# Patient Record
Sex: FEMALE | Race: BLACK OR AFRICAN AMERICAN | NOT HISPANIC OR LATINO | Employment: FULL TIME | URBAN - METROPOLITAN AREA
[De-identification: names, ages, dates, MRNs, and addresses within clinical notes are randomized per-mention and may not be internally consistent; named-entity substitution may affect disease eponyms.]

---

## 2017-04-24 ENCOUNTER — PATIENT OUTREACH (OUTPATIENT)
Dept: ADMINISTRATIVE | Facility: HOSPITAL | Age: 37
End: 2017-04-24

## 2019-01-15 PROBLEM — E87.6 HYPOKALEMIA: Status: ACTIVE | Noted: 2019-01-15

## 2019-01-15 NOTE — PROGRESS NOTES
Subjective:       Patient ID: Akilah Parker is a 38 y.o. female who  has a past medical history of Acne vulgaris, Ganglion cyst, Hypertension, CHARISSE (iron deficiency anemia), Kidney stone, and Thyroid nodule (2014).    Chief Complaint: Establish Care (Low energy)    HPI    History was obtained from the patient and supplemented through chart review.  Saw OBGYN in the past for BV  Used to see Dr. Brenner in 2014 for hypertension.    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  Prior to that was receiving care at [facility] where she was working.    Low energy, insomnia:  Chronic.  Works 12 hr days, 7 on, 7 off with hospitalist group.  Goes to bed at 10:00 p.m. on work days.  On her off days, goes to bed at 8:00 p.m. and wakes up at 2:00 a.m..  The patient reports difficulty staying asleep.  The patient does watch TV at night and does have a TV in the bedroom; she keeps the TV on all night for white noise.  Will get something to drink and scroll through Facebook on her phone.  The bedroom is quiet and dark.  The patient does drink coke.  The patient does not wake up multiple times in the evening to urinate.  She reports racing thoughts at night and worries that she forgot to add orders.  Occasional ETOH. She does not exercise.    History of HTN:  Pt's BP is well controlled.  Used to take HCTZ 12.5; has been off x 1 year. Pt denies cp/sob.    Hypokalemia:  3.4 several years ago.  Was instructed to increase potassium through diet/bananas.    Thyroid nodule:  Thyroid ultrasound 2014 with stable scattered micro nodules.  No FNA needed.  TSH WNL in 2012.  Also stable annual ultrasounds at [facility].  Has BM q2-3 days.  Not straining or pebbly.    Acne vulgaris:  Used to be on Epiduo/benzoyl peroxide topical and Minocycyline    Obesity:  BMI 36.  Not exercising.  Has a gym at work.  Has to stay 12 hr and responds to stroke codes.    Review of Systems   Constitutional: Negative for fever and unexpected weight  change.   HENT: Negative for rhinorrhea and sneezing.    Eyes: Negative for redness and itching.   Respiratory: Negative for shortness of breath and wheezing.    Cardiovascular: Negative for chest pain and palpitations.   Gastrointestinal: Negative for abdominal pain and constipation.   Genitourinary: Negative for dysuria and hematuria.   Musculoskeletal: Negative for gait problem and joint swelling.   Skin: Negative for color change and rash.   Neurological: Negative for dizziness and light-headedness.   Hematological: Negative for adenopathy.   Psychiatric/Behavioral: Negative for self-injury and suicidal ideas.       Past Medical History:   Diagnosis Date    Acne vulgaris     Ganglion cyst     bilateral     Hypertension     CHARISSE (iron deficiency anemia)     Kidney stone     Thyroid nodule 2014    Scattered micro nodules, stable in size compared to 2014     Past Surgical History:   Procedure Laterality Date    CYSTOSCOPY Left 1/7/2014    [facility] at [facility]    EXTRACTION - STONE Left 1/7/2014    [facility] at [facility]    ORIF FOOT FRACTURE      right ankle    OTHER SURGICAL HISTORY      retrieved kidney stone without lithotripsy    PLACEMENT-STENT Left 1/7/2014    [facility]    TUBAL LIGATION      URETEROSCOPY Left 1/7/2014    [facility]     Family History   Problem Relation Age of Onset    Hyperlipidemia Father     Hypertension Father     Stroke Father     Aneurysm Maternal Grandmother     Heart disease Maternal Grandfather     Heart disease Paternal Grandmother     Hypertension Mother     Colon polyps Mother         40s, precancerous    Acne Neg Hx     Eczema Neg Hx     Melanoma Neg Hx     Breast cancer Neg Hx     Colon cancer Neg Hx     Ovarian cancer Neg Hx      Social History     Socioeconomic History    Marital status: Single     Spouse  "name: Not on file    Number of children: Not on file    Years of education: Not on file    Highest education level: Not on file   Social Needs    Financial resource strain: Not on file    Food insecurity - worry: Not on file    Food insecurity - inability: Not on file    Transportation needs - medical: Not on file    Transportation needs - non-medical: Not on file   Occupational History    Occupation: RN     Employer: [employer]   Tobacco Use    Smoking status: Never Smoker    Smokeless tobacco: Never Used   Substance and Sexual Activity    Alcohol use: Yes     Comment: occassionally    Drug use: No    Sexual activity: Yes     Partners: Male     Birth control/protection: None, Surgical   Other Topics Concern    Are you pregnant or think you may be? No    Breast-feeding No   Social History Narrative    Not on file     Objective:      Vitals:    01/16/19 0942   BP: (!) 121/90   Pulse: 87   SpO2: 100%   Weight: 100 kg (220 lb 7.4 oz)   Height: 5' 5" (1.651 m)      Physical Exam   Constitutional: She appears well-developed and well-nourished. No distress.   HENT:   Head: Normocephalic and atraumatic.   Nose: Nose normal.   Mouth/Throat: Oropharynx is clear and moist. No oropharyngeal exudate.   Eyes: Conjunctivae and EOM are normal. Pupils are equal, round, and reactive to light. Right eye exhibits no discharge. Left eye exhibits no discharge. No scleral icterus.   Neck: Neck supple. No tracheal deviation present. No thyromegaly present.   Cardiovascular: Normal rate, regular rhythm, normal heart sounds and intact distal pulses.   No murmur heard.  Pulmonary/Chest: Effort normal and breath sounds normal. No respiratory distress. She has no wheezes.   Abdominal: Soft. Bowel sounds are normal. There is no tenderness.   Musculoskeletal: She exhibits no tenderness or deformity.   Lymphadenopathy:     She has no cervical adenopathy.   Neurological: She is alert. No cranial nerve deficit. Gait " normal.   Skin: Skin is warm and dry. Capillary refill takes less than 2 seconds. She is not diaphoretic. No erythema.   Psychiatric: She has a normal mood and affect. Her behavior is normal.         Lab Results   Component Value Date    WBC 4.03 01/16/2019    HGB 11.0 (L) 01/16/2019    HCT 34.3 (L) 01/16/2019     01/16/2019    CHOL 184 01/16/2019    TRIG 44 01/16/2019    HDL 55 01/16/2019    ALT 6 (L) 01/16/2019    AST 13 01/16/2019     01/16/2019    K 4.0 01/16/2019     01/16/2019    CREATININE 0.9 01/16/2019    BUN 13 01/16/2019    CO2 25 01/16/2019    TSH 0.992 01/16/2019    HGBA1C 5.4 01/16/2019       The ASCVD Risk score (Vaibhavrishi EMERY Jr., et al., 2013) failed to calculate for the following reasons:    The 2013 ASCVD risk score is only valid for ages 40 to 79    (Imaging have been independently reviewed)  Thyroid ultrasound in 2014 showed prominent lymph node inferior to left thyroid node; stable compared to 2012    Assessment:       1. Other insomnia    2. Essential hypertension    3. Hypokalemia    4. Multiple thyroid nodules    5. Acne vulgaris    6. Obesity (BMI 30-39.9)    7. Healthcare maintenance    8. Encounter for lipid screening for cardiovascular disease    9. Encounter for screening for diabetes mellitus    10. Screening for thyroid disorder    11. Iron deficiency anemia, unspecified iron deficiency anemia type          Plan:       Akilah was seen today for establish care.    Diagnoses and all orders for this visit:    Other insomnia  Comments:  Discussed sleep hygiene, decreasing screen time, sravanthi for white noise rather than TV, keeping journal, exercise. Likely contributing to low energy. New CHARISSE.  Orders:  -     TSH; Future  -     T4, free; Future    Essential hypertension  Comments:  Used to be on HCTZ 12.5.  BP controlled off any BP meds.  Will continue to monitor    Hypokalemia  Comments:  Mildly low at 3.4 several years ago.  Rechecking  Orders:  -     Comprehensive metabolic  panel; Future    Multiple thyroid nodules  Comments:  Scattered micro nodules, not large enough for FNA.  Has been stable on annual ultrasounds here and at [facility]. Will discontinue annual U/S unless symtpoms.  Orders:  -     TSH; Future  -     T4, free; Future    Acne vulgaris  Comments:  Used to be on Epiduo/benzoyl peroxide and minocycline.  Well controlled off meds    Obesity (BMI 30-39.9)  Comments:  Is not exercising.  Will discuss diet and exercise in depth during next visit    Healthcare maintenance  -     CBC auto differential; Future  -     Comprehensive metabolic panel; Future    Encounter for lipid screening for cardiovascular disease  Comments:  Screening due to obesity  Orders:  -     Lipid panel; Future    Encounter for screening for diabetes mellitus  Comments:  Screening due to obesity  Orders:  -     Hemoglobin A1c; Future    Screening for thyroid disorder  Comments:  Screening due to low energy, history of multiple thyroid nodules  Orders:  -     TSH; Future  -     T4, free; Future    Iron deficiency anemia, unspecified iron deficiency anemia type  Comments:  Addendum: New CHARISSE. Ferritin 9. Calling the pt to start iron. Will discuss etiology of this during next visit. Might be contributing to low energy.  Orders:  -     ferrous sulfate 325 (65 FE) MG EC tablet; Take 1 tablet (325 mg total) by mouth 2 (two) times daily.  -     Ferritin; Future  -     Iron and TIBC; Future         Side effects of medication(s) were discussed in detail and patient voiced understanding.  Patient will call back for any issues or complications.     RTC in 1 month(s) or sooner PRN to discuss diet, exercise, monitor BP, well visit.

## 2019-01-16 ENCOUNTER — LAB VISIT (OUTPATIENT)
Dept: LAB | Facility: OTHER | Age: 39
End: 2019-01-16
Payer: COMMERCIAL

## 2019-01-16 ENCOUNTER — OFFICE VISIT (OUTPATIENT)
Dept: INTERNAL MEDICINE | Facility: CLINIC | Age: 39
End: 2019-01-16
Payer: COMMERCIAL

## 2019-01-16 VITALS
HEIGHT: 65 IN | OXYGEN SATURATION: 100 % | SYSTOLIC BLOOD PRESSURE: 121 MMHG | WEIGHT: 220.44 LBS | HEART RATE: 87 BPM | BODY MASS INDEX: 36.73 KG/M2 | DIASTOLIC BLOOD PRESSURE: 90 MMHG

## 2019-01-16 DIAGNOSIS — E66.9 OBESITY (BMI 30-39.9): ICD-10-CM

## 2019-01-16 DIAGNOSIS — Z13.6 ENCOUNTER FOR LIPID SCREENING FOR CARDIOVASCULAR DISEASE: ICD-10-CM

## 2019-01-16 DIAGNOSIS — E04.2 MULTIPLE THYROID NODULES: ICD-10-CM

## 2019-01-16 DIAGNOSIS — Z13.220 ENCOUNTER FOR LIPID SCREENING FOR CARDIOVASCULAR DISEASE: ICD-10-CM

## 2019-01-16 DIAGNOSIS — Z00.00 HEALTHCARE MAINTENANCE: ICD-10-CM

## 2019-01-16 DIAGNOSIS — E87.6 HYPOKALEMIA: ICD-10-CM

## 2019-01-16 DIAGNOSIS — Z13.1 ENCOUNTER FOR SCREENING FOR DIABETES MELLITUS: ICD-10-CM

## 2019-01-16 DIAGNOSIS — Z13.29 SCREENING FOR THYROID DISORDER: ICD-10-CM

## 2019-01-16 DIAGNOSIS — L70.0 ACNE VULGARIS: ICD-10-CM

## 2019-01-16 DIAGNOSIS — D50.9 IRON DEFICIENCY ANEMIA, UNSPECIFIED IRON DEFICIENCY ANEMIA TYPE: ICD-10-CM

## 2019-01-16 DIAGNOSIS — G47.09 OTHER INSOMNIA: Primary | ICD-10-CM

## 2019-01-16 DIAGNOSIS — I10 ESSENTIAL HYPERTENSION: Chronic | ICD-10-CM

## 2019-01-16 PROBLEM — D64.9 NORMOCYTIC ANEMIA: Status: ACTIVE | Noted: 2019-01-16

## 2019-01-16 LAB
ALBUMIN SERPL BCP-MCNC: 3.4 G/DL
ALP SERPL-CCNC: 70 U/L
ALT SERPL W/O P-5'-P-CCNC: 6 U/L
ANION GAP SERPL CALC-SCNC: 8 MMOL/L
AST SERPL-CCNC: 13 U/L
BASOPHILS # BLD AUTO: 0.01 K/UL
BASOPHILS NFR BLD: 0.2 %
BILIRUB SERPL-MCNC: 0.4 MG/DL
BUN SERPL-MCNC: 13 MG/DL
CALCIUM SERPL-MCNC: 9.2 MG/DL
CHLORIDE SERPL-SCNC: 107 MMOL/L
CHOLEST SERPL-MCNC: 184 MG/DL
CHOLEST/HDLC SERPL: 3.3 {RATIO}
CO2 SERPL-SCNC: 25 MMOL/L
CREAT SERPL-MCNC: 0.9 MG/DL
DIFFERENTIAL METHOD: ABNORMAL
EOSINOPHIL # BLD AUTO: 0.1 K/UL
EOSINOPHIL NFR BLD: 2.2 %
ERYTHROCYTE [DISTWIDTH] IN BLOOD BY AUTOMATED COUNT: 15 %
EST. GFR  (AFRICAN AMERICAN): >60 ML/MIN/1.73 M^2
EST. GFR  (NON AFRICAN AMERICAN): >60 ML/MIN/1.73 M^2
ESTIMATED AVG GLUCOSE: 108 MG/DL
FERRITIN SERPL-MCNC: 8 NG/ML
GLUCOSE SERPL-MCNC: 77 MG/DL
HBA1C MFR BLD HPLC: 5.4 %
HCT VFR BLD AUTO: 34.3 %
HDLC SERPL-MCNC: 55 MG/DL
HDLC SERPL: 29.9 %
HGB BLD-MCNC: 11 G/DL
IRON SERPL-MCNC: 41 UG/DL
LDLC SERPL CALC-MCNC: 120.2 MG/DL
LYMPHOCYTES # BLD AUTO: 1.7 K/UL
LYMPHOCYTES NFR BLD: 41.7 %
MCH RBC QN AUTO: 27.6 PG
MCHC RBC AUTO-ENTMCNC: 32.1 G/DL
MCV RBC AUTO: 86 FL
MONOCYTES # BLD AUTO: 0.3 K/UL
MONOCYTES NFR BLD: 6.9 %
NEUTROPHILS # BLD AUTO: 2 K/UL
NEUTROPHILS NFR BLD: 49 %
NONHDLC SERPL-MCNC: 129 MG/DL
PLATELET # BLD AUTO: 281 K/UL
PMV BLD AUTO: 11.1 FL
POTASSIUM SERPL-SCNC: 4 MMOL/L
PROT SERPL-MCNC: 7.3 G/DL
RBC # BLD AUTO: 3.98 M/UL
SATURATED IRON: 8 %
SODIUM SERPL-SCNC: 140 MMOL/L
T4 FREE SERPL-MCNC: 0.92 NG/DL
TOTAL IRON BINDING CAPACITY: 491 UG/DL
TRANSFERRIN SERPL-MCNC: 332 MG/DL
TRIGL SERPL-MCNC: 44 MG/DL
TSH SERPL DL<=0.005 MIU/L-ACNC: 0.99 UIU/ML
WBC # BLD AUTO: 4.03 K/UL

## 2019-01-16 PROCEDURE — 99999 PR PBB SHADOW E&M-EST. PATIENT-LVL III: CPT | Mod: PBBFAC,,, | Performed by: INTERNAL MEDICINE

## 2019-01-16 PROCEDURE — 83540 ASSAY OF IRON: CPT

## 2019-01-16 PROCEDURE — 36415 COLL VENOUS BLD VENIPUNCTURE: CPT

## 2019-01-16 PROCEDURE — 99385 PR PREVENTIVE VISIT,NEW,18-39: ICD-10-PCS | Mod: S$GLB,,, | Performed by: INTERNAL MEDICINE

## 2019-01-16 PROCEDURE — 3008F PR BODY MASS INDEX (BMI) DOCUMENTED: ICD-10-PCS | Mod: CPTII,S$GLB,, | Performed by: INTERNAL MEDICINE

## 2019-01-16 PROCEDURE — 84439 ASSAY OF FREE THYROXINE: CPT

## 2019-01-16 PROCEDURE — 3080F DIAST BP >= 90 MM HG: CPT | Mod: CPTII,S$GLB,, | Performed by: INTERNAL MEDICINE

## 2019-01-16 PROCEDURE — 3074F PR MOST RECENT SYSTOLIC BLOOD PRESSURE < 130 MM HG: ICD-10-PCS | Mod: CPTII,S$GLB,, | Performed by: INTERNAL MEDICINE

## 2019-01-16 PROCEDURE — 99999 PR PBB SHADOW E&M-EST. PATIENT-LVL III: ICD-10-PCS | Mod: PBBFAC,,, | Performed by: INTERNAL MEDICINE

## 2019-01-16 PROCEDURE — 85025 COMPLETE CBC W/AUTO DIFF WBC: CPT

## 2019-01-16 PROCEDURE — 80061 LIPID PANEL: CPT

## 2019-01-16 PROCEDURE — 82728 ASSAY OF FERRITIN: CPT

## 2019-01-16 PROCEDURE — 83036 HEMOGLOBIN GLYCOSYLATED A1C: CPT

## 2019-01-16 PROCEDURE — 3080F PR MOST RECENT DIASTOLIC BLOOD PRESSURE >= 90 MM HG: ICD-10-PCS | Mod: CPTII,S$GLB,, | Performed by: INTERNAL MEDICINE

## 2019-01-16 PROCEDURE — 84443 ASSAY THYROID STIM HORMONE: CPT

## 2019-01-16 PROCEDURE — 80053 COMPREHEN METABOLIC PANEL: CPT

## 2019-01-16 PROCEDURE — 3008F BODY MASS INDEX DOCD: CPT | Mod: CPTII,S$GLB,, | Performed by: INTERNAL MEDICINE

## 2019-01-16 PROCEDURE — 3074F SYST BP LT 130 MM HG: CPT | Mod: CPTII,S$GLB,, | Performed by: INTERNAL MEDICINE

## 2019-01-16 PROCEDURE — 99385 PREV VISIT NEW AGE 18-39: CPT | Mod: S$GLB,,, | Performed by: INTERNAL MEDICINE

## 2019-01-16 RX ORDER — FERROUS SULFATE 325(65) MG
325 TABLET, DELAYED RELEASE (ENTERIC COATED) ORAL 2 TIMES DAILY
Qty: 180 TABLET | Refills: 1 | Status: SHIPPED | OUTPATIENT
Start: 2019-01-16 | End: 2019-04-16

## 2019-01-17 ENCOUNTER — TELEPHONE (OUTPATIENT)
Dept: INTERNAL MEDICINE | Facility: CLINIC | Age: 39
End: 2019-01-17

## 2019-01-17 NOTE — TELEPHONE ENCOUNTER
Spoke with patient to discuss labs and patient stated that she already saw it on the patient portal thank you

## 2019-01-18 ENCOUNTER — TELEPHONE (OUTPATIENT)
Dept: INTERNAL MEDICINE | Facility: CLINIC | Age: 39
End: 2019-01-18

## 2019-01-18 DIAGNOSIS — K21.9 GASTROESOPHAGEAL REFLUX DISEASE, ESOPHAGITIS PRESENCE NOT SPECIFIED: ICD-10-CM

## 2019-01-18 RX ORDER — OMEPRAZOLE 40 MG/1
40 CAPSULE, DELAYED RELEASE ORAL EVERY MORNING
Qty: 90 CAPSULE | Refills: 0 | Status: SHIPPED | OUTPATIENT
Start: 2019-01-18 | End: 2020-05-12

## 2019-01-18 NOTE — TELEPHONE ENCOUNTER
----- Message from Aretha Fiore MA sent at 1/18/2019 11:36 AM CST -----  Contact: ENRIQUE STARR [[id number]]   Patient wants omeprazole 40mg daily ordered for acid reflux  ----- Message -----  From: Sammy Rader  Sent: 1/18/2019  11:16 AM  To: Vijay Stout Staff    Name of Who is Calling: ENRIQUE STARR [[id number]]       What is the request in detail: Patient called in regards to reflex medication. Pt request Omeprazole 40mg daily. Please advise.       Can the clinic reply by [facility]: No     What Number to [facility] Back [facility]: [phone]

## 2019-05-13 ENCOUNTER — PATIENT MESSAGE (OUTPATIENT)
Dept: INTERNAL MEDICINE | Facility: CLINIC | Age: 39
End: 2019-05-13

## 2019-05-13 DIAGNOSIS — G47.09 OTHER INSOMNIA: Primary | ICD-10-CM

## 2019-05-14 ENCOUNTER — PATIENT MESSAGE (OUTPATIENT)
Dept: INTERNAL MEDICINE | Facility: CLINIC | Age: 39
End: 2019-05-14

## 2019-05-14 RX ORDER — ESZOPICLONE 1 MG/1
1 TABLET, FILM COATED ORAL NIGHTLY PRN
Qty: 30 TABLET | Refills: 1 | Status: SHIPPED | OUTPATIENT
Start: 2019-05-14 | End: 2019-06-13

## 2020-04-21 DIAGNOSIS — Z01.84 ANTIBODY RESPONSE EXAMINATION: ICD-10-CM

## 2020-04-28 ENCOUNTER — LAB VISIT (OUTPATIENT)
Dept: LAB | Facility: HOSPITAL | Age: 40
End: 2020-04-28
Attending: INTERNAL MEDICINE
Payer: COMMERCIAL

## 2020-04-28 DIAGNOSIS — Z01.84 ANTIBODY RESPONSE EXAMINATION: ICD-10-CM

## 2020-04-28 LAB — SARS-COV-2 IGG SERPL QL IA: NEGATIVE

## 2020-04-28 PROCEDURE — 86769 SARS-COV-2 COVID-19 ANTIBODY: CPT

## 2020-04-28 PROCEDURE — 36415 COLL VENOUS BLD VENIPUNCTURE: CPT

## 2020-05-11 ENCOUNTER — TELEPHONE (OUTPATIENT)
Dept: ADMINISTRATIVE | Facility: HOSPITAL | Age: 40
End: 2020-05-11

## 2020-05-11 NOTE — TELEPHONE ENCOUNTER
The patient was phoned about an upcoming visit. The patient stated that she will be getting off from work on tomorrow morning and would like her lab work ordered so that she can do them on tomorrow morning. I will send a message to her PCP.      Consuelo MUHAMMAD  Clinical Care Coordinator  Internal Medicine  [facility]  [phone]

## 2020-05-12 ENCOUNTER — OFFICE VISIT (OUTPATIENT)
Dept: INTERNAL MEDICINE | Facility: CLINIC | Age: 40
End: 2020-05-12
Payer: COMMERCIAL

## 2020-05-12 ENCOUNTER — TELEPHONE (OUTPATIENT)
Dept: OBSTETRICS AND GYNECOLOGY | Facility: CLINIC | Age: 40
End: 2020-05-12

## 2020-05-12 ENCOUNTER — LAB VISIT (OUTPATIENT)
Dept: LAB | Facility: OTHER | Age: 40
End: 2020-05-12
Attending: INTERNAL MEDICINE
Payer: COMMERCIAL

## 2020-05-12 VITALS
DIASTOLIC BLOOD PRESSURE: 78 MMHG | HEIGHT: 65 IN | BODY MASS INDEX: 34.85 KG/M2 | SYSTOLIC BLOOD PRESSURE: 122 MMHG | WEIGHT: 209.19 LBS | HEART RATE: 72 BPM | OXYGEN SATURATION: 99 %

## 2020-05-12 DIAGNOSIS — Z11.4 SCREENING FOR HIV (HUMAN IMMUNODEFICIENCY VIRUS): ICD-10-CM

## 2020-05-12 DIAGNOSIS — E66.9 OBESITY (BMI 30.0-34.9): ICD-10-CM

## 2020-05-12 DIAGNOSIS — D50.9 IRON DEFICIENCY ANEMIA, UNSPECIFIED IRON DEFICIENCY ANEMIA TYPE: ICD-10-CM

## 2020-05-12 DIAGNOSIS — Z00.00 ANNUAL PHYSICAL EXAM: Primary | ICD-10-CM

## 2020-05-12 DIAGNOSIS — I10 ESSENTIAL HYPERTENSION: ICD-10-CM

## 2020-05-12 DIAGNOSIS — Z13.220 ENCOUNTER FOR LIPID SCREENING FOR CARDIOVASCULAR DISEASE: ICD-10-CM

## 2020-05-12 DIAGNOSIS — Z13.6 ENCOUNTER FOR LIPID SCREENING FOR CARDIOVASCULAR DISEASE: ICD-10-CM

## 2020-05-12 DIAGNOSIS — E04.2 MULTIPLE THYROID NODULES: ICD-10-CM

## 2020-05-12 DIAGNOSIS — Z23 NEED FOR TETANUS, DIPHTHERIA, AND ACELLULAR PERTUSSIS (TDAP) VACCINE: ICD-10-CM

## 2020-05-12 DIAGNOSIS — N92.0 MENORRHAGIA WITH REGULAR CYCLE: ICD-10-CM

## 2020-05-12 PROBLEM — E66.811 OBESITY (BMI 30.0-34.9): Status: ACTIVE | Noted: 2019-01-16

## 2020-05-12 LAB
ALBUMIN SERPL BCP-MCNC: 3.6 G/DL (ref 3.5–5.2)
ALP SERPL-CCNC: 65 U/L (ref 55–135)
ALT SERPL W/O P-5'-P-CCNC: 8 U/L (ref 10–44)
ANION GAP SERPL CALC-SCNC: 9 MMOL/L (ref 8–16)
AST SERPL-CCNC: 14 U/L (ref 10–40)
BASOPHILS # BLD AUTO: 0.02 K/UL (ref 0–0.2)
BASOPHILS NFR BLD: 0.4 % (ref 0–1.9)
BILIRUB SERPL-MCNC: 0.4 MG/DL (ref 0.1–1)
BUN SERPL-MCNC: 10 MG/DL (ref 6–20)
CALCIUM SERPL-MCNC: 9 MG/DL (ref 8.7–10.5)
CHLORIDE SERPL-SCNC: 107 MMOL/L (ref 95–110)
CHOLEST SERPL-MCNC: 173 MG/DL (ref 120–199)
CHOLEST/HDLC SERPL: 3.5 {RATIO} (ref 2–5)
CO2 SERPL-SCNC: 25 MMOL/L (ref 23–29)
CREAT SERPL-MCNC: 0.9 MG/DL (ref 0.5–1.4)
DIFFERENTIAL METHOD: ABNORMAL
EOSINOPHIL # BLD AUTO: 0.1 K/UL (ref 0–0.5)
EOSINOPHIL NFR BLD: 1.8 % (ref 0–8)
ERYTHROCYTE [DISTWIDTH] IN BLOOD BY AUTOMATED COUNT: 18.5 % (ref 11.5–14.5)
EST. GFR  (AFRICAN AMERICAN): >60 ML/MIN/1.73 M^2
EST. GFR  (NON AFRICAN AMERICAN): >60 ML/MIN/1.73 M^2
GLUCOSE SERPL-MCNC: 79 MG/DL (ref 70–110)
HCT VFR BLD AUTO: 28.5 % (ref 37–48.5)
HDLC SERPL-MCNC: 49 MG/DL (ref 40–75)
HDLC SERPL: 28.3 % (ref 20–50)
HGB BLD-MCNC: 8.5 G/DL (ref 12–16)
IMM GRANULOCYTES # BLD AUTO: 0.01 K/UL (ref 0–0.04)
IMM GRANULOCYTES NFR BLD AUTO: 0.2 % (ref 0–0.5)
IRON SERPL-MCNC: 20 UG/DL (ref 30–160)
LDLC SERPL CALC-MCNC: 114.2 MG/DL (ref 63–159)
LYMPHOCYTES # BLD AUTO: 1.9 K/UL (ref 1–4.8)
LYMPHOCYTES NFR BLD: 37.6 % (ref 18–48)
MCH RBC QN AUTO: 23 PG (ref 27–31)
MCHC RBC AUTO-ENTMCNC: 29.8 G/DL (ref 32–36)
MCV RBC AUTO: 77 FL (ref 82–98)
MONOCYTES # BLD AUTO: 0.4 K/UL (ref 0.3–1)
MONOCYTES NFR BLD: 7 % (ref 4–15)
NEUTROPHILS # BLD AUTO: 2.6 K/UL (ref 1.8–7.7)
NEUTROPHILS NFR BLD: 53 % (ref 38–73)
NONHDLC SERPL-MCNC: 124 MG/DL
NRBC BLD-RTO: 0 /100 WBC
PLATELET # BLD AUTO: 418 K/UL (ref 150–350)
PMV BLD AUTO: 11 FL (ref 9.2–12.9)
POTASSIUM SERPL-SCNC: 3.7 MMOL/L (ref 3.5–5.1)
PROT SERPL-MCNC: 7.3 G/DL (ref 6–8.4)
RBC # BLD AUTO: 3.7 M/UL (ref 4–5.4)
SATURATED IRON: 4 % (ref 20–50)
SODIUM SERPL-SCNC: 141 MMOL/L (ref 136–145)
TOTAL IRON BINDING CAPACITY: 540 UG/DL (ref 250–450)
TRANSFERRIN SERPL-MCNC: 365 MG/DL (ref 200–375)
TRIGL SERPL-MCNC: 49 MG/DL (ref 30–150)
TSH SERPL DL<=0.005 MIU/L-ACNC: 1.52 UIU/ML (ref 0.4–4)
WBC # BLD AUTO: 4.98 K/UL (ref 3.9–12.7)

## 2020-05-12 PROCEDURE — 36415 COLL VENOUS BLD VENIPUNCTURE: CPT

## 2020-05-12 PROCEDURE — 99999 PR PBB SHADOW E&M-EST. PATIENT-LVL III: CPT | Mod: PBBFAC,,, | Performed by: INTERNAL MEDICINE

## 2020-05-12 PROCEDURE — 3074F SYST BP LT 130 MM HG: CPT | Mod: CPTII,S$GLB,, | Performed by: INTERNAL MEDICINE

## 2020-05-12 PROCEDURE — 80053 COMPREHEN METABOLIC PANEL: CPT

## 2020-05-12 PROCEDURE — 85025 COMPLETE CBC W/AUTO DIFF WBC: CPT

## 2020-05-12 PROCEDURE — 3078F PR MOST RECENT DIASTOLIC BLOOD PRESSURE < 80 MM HG: ICD-10-PCS | Mod: CPTII,S$GLB,, | Performed by: INTERNAL MEDICINE

## 2020-05-12 PROCEDURE — 83540 ASSAY OF IRON: CPT

## 2020-05-12 PROCEDURE — 99395 PREV VISIT EST AGE 18-39: CPT | Mod: S$GLB,,, | Performed by: INTERNAL MEDICINE

## 2020-05-12 PROCEDURE — 86703 HIV-1/HIV-2 1 RESULT ANTBDY: CPT

## 2020-05-12 PROCEDURE — 3078F DIAST BP <80 MM HG: CPT | Mod: CPTII,S$GLB,, | Performed by: INTERNAL MEDICINE

## 2020-05-12 PROCEDURE — 82607 VITAMIN B-12: CPT

## 2020-05-12 PROCEDURE — 82728 ASSAY OF FERRITIN: CPT

## 2020-05-12 PROCEDURE — 99395 PR PREVENTIVE VISIT,EST,18-39: ICD-10-PCS | Mod: S$GLB,,, | Performed by: INTERNAL MEDICINE

## 2020-05-12 PROCEDURE — 3074F PR MOST RECENT SYSTOLIC BLOOD PRESSURE < 130 MM HG: ICD-10-PCS | Mod: CPTII,S$GLB,, | Performed by: INTERNAL MEDICINE

## 2020-05-12 PROCEDURE — 84443 ASSAY THYROID STIM HORMONE: CPT

## 2020-05-12 PROCEDURE — 99999 PR PBB SHADOW E&M-EST. PATIENT-LVL III: ICD-10-PCS | Mod: PBBFAC,,, | Performed by: INTERNAL MEDICINE

## 2020-05-12 PROCEDURE — 80061 LIPID PANEL: CPT

## 2020-05-12 RX ORDER — FERROUS SULFATE 325(65) MG
325 TABLET, DELAYED RELEASE (ENTERIC COATED) ORAL 2 TIMES DAILY
Qty: 180 TABLET | Refills: 3 | Status: SHIPPED | OUTPATIENT
Start: 2020-05-12 | End: 2021-05-12

## 2020-05-12 NOTE — TELEPHONE ENCOUNTER
----- Message from Leny Barraza sent at 5/12/2020  1:51 PM CDT -----  Contact: Patient [phone]   Name of Caller Patient  Reason for Visit/Symptoms Heavy Menstrual   Best Contact Number or Confirm if Mychart Preferred [phone]  Preferred Date/Time of Appointment Soon  Interested in Virtual Visit (yes/no) No  Additional Information Pt would like to come into clinic for visit.           Patient has new pt appt on 6/5/2020 for annual and fibroids.

## 2020-05-12 NOTE — PROGRESS NOTES
Subjective:       Patient ID: Akilah Parker is a 39 y.o. female who  has a past medical history of Acne vulgaris, Ganglion cyst, Hypertension, CHARISSE (iron deficiency anemia), Kidney stone, and Thyroid nodule (2014).    Chief Complaint: Annual Exam and Anemia    History was obtained from the patient and supplemented through chart review.  There were no ER or clinic visits since our last appointment.    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      HPI    CHARISSE:   Has not been taking Iron supplements.  Feels that her anemia is worse.  Now craving ice, strong fumes. Has hair thinning.  No fatigue, SOB, palpitation.    + menorrhagia, worsening.  H/o fibroids, but previously declined hyst.  Now desires hyst. Reports regular cycles.  Periods last 7-8 days with clots.  Planning to see OBGYN on the [employer].   Lab Results   Component Value Date    IRON 41 01/16/2019    TIBC 491 (H) 01/16/2019    FERRITIN 8 (L) 01/16/2019     No results found for: [id number]  No results found for: FOLATE    History of HTN:    Pt's BP is well controlled.  Used to take HCTZ 12.5; has been off since 2018. Pt denies cp/sob.    Thyroid nodule:    Thyroid ultrasound 2014 with stable scattered micro nodules.  No FNA needed.  Also had stable annual ultrasounds at [facility].  Has not noticed goiter or nodules.  Lab Results   Component Value Date    TSH 0.992 01/16/2019    FREET4 0.92 01/16/2019     Obesity:  BMI 34.  Has a gym at work at night.  Recently going to park.            Not addressed today.  Low energy, insomnia:    Chronic.  Works 12 hr days, 7 on, 7 off with [employer].  Goes to bed at 10:00 p.m. on work days.  On her off days, goes to bed at 8:00 p.m. and wakes up at 2:00 a.m..  The patient reports difficulty staying asleep.  The patient does watch TV at night and does have a TV in the bedroom; she keeps the TV on all night for white noise.  Will get something to drink and scroll through Facebook on her  "phone.  The bedroom is quiet and dark.  The patient does drink coke.  The patient does not wake up multiple times in the evening to urinate.  She reports racing thoughts at night and worries that she forgot to add orders.  Occasional ETOH. She does not exercise.    Restless at night    Review of Systems   Constitutional: Negative for activity change, fatigue, fever and unexpected weight change.   HENT: Negative for hearing loss, rhinorrhea, sneezing and trouble swallowing.    Eyes: Negative for discharge, redness, itching and visual disturbance.   Respiratory: Negative for chest tightness, shortness of breath and wheezing.    Cardiovascular: Negative for chest pain and palpitations.   Gastrointestinal: Negative for abdominal pain, blood in stool, constipation, diarrhea and vomiting.   Endocrine: Negative for polydipsia and polyuria.   Genitourinary: Positive for menstrual problem. Negative for difficulty urinating, dysuria and hematuria.   Musculoskeletal: Negative for arthralgias, gait problem, joint swelling and neck pain.   Skin: Negative for color change and rash.   Neurological: Negative for dizziness, weakness, light-headedness and headaches.   Hematological: Negative for adenopathy.   Psychiatric/Behavioral: Negative for confusion and dysphoric mood.       I personally reviewed Past Medical History, Past Surgical History, Social History, and Family History.    Objective:      Vitals:    05/12/20 1320   BP: 122/78   Pulse: 72   SpO2: 99%   Weight: 94.9 kg (209 lb 3.5 oz)   Height: 5' 5" (1.651 m)      Physical Exam   Constitutional: She appears well-developed and well-nourished. No distress.   HENT:   Head: Normocephalic and atraumatic.   Nose: Nose normal.   Mouth/Throat: Oropharynx is clear and moist. No oropharyngeal exudate.   Eyes: Conjunctivae and EOM are normal. Right eye exhibits no discharge. Left eye exhibits no discharge. No scleral icterus.   Pale conjunctiva   Neck: Neck supple. No tracheal " deviation present. No thyromegaly present.   Cardiovascular: Normal rate, regular rhythm, normal heart sounds and intact distal pulses.   No murmur heard.  Pulmonary/Chest: Effort normal and breath sounds normal. No respiratory distress. She has no wheezes.   Abdominal: Soft. Bowel sounds are normal. There is no tenderness.   Musculoskeletal: She exhibits no tenderness or deformity.   Lymphadenopathy:     She has no cervical adenopathy.   Neurological: She is alert. No cranial nerve deficit. Gait normal.   Skin: Skin is warm and dry. Capillary refill takes less than 2 seconds. She is not diaphoretic. No erythema.   Psychiatric: She has a normal mood and affect. Her behavior is normal.         Lab Results   Component Value Date    WBC 4.03 01/16/2019    HGB 11.0 (L) 01/16/2019    HCT 34.3 (L) 01/16/2019     01/16/2019    CHOL 184 01/16/2019    TRIG 44 01/16/2019    HDL 55 01/16/2019    ALT 6 (L) 01/16/2019    AST 13 01/16/2019     01/16/2019    K 4.0 01/16/2019     01/16/2019    CREATININE 0.9 01/16/2019    BUN 13 01/16/2019    CO2 25 01/16/2019    TSH 0.992 01/16/2019    HGBA1C 5.4 01/16/2019       The ASCVD Risk score (Franklin DC Jr., et al., 2013) failed to calculate for the following reasons:    The 2013 ASCVD risk score is only valid for ages 40 to 79    (Imaging have been independently reviewed)  Thyroid ultrasound in 2014 showed prominent lymph node inferior to left thyroid node; stable compared to 2012    Assessment:       1. Annual physical exam    2. Iron deficiency anemia, unspecified iron deficiency anemia type    3. Menorrhagia with regular cycle    4. Essential hypertension    5. Multiple thyroid nodules    6. Obesity (BMI 30.0-34.9)    7. Screening for HIV (human immunodeficiency virus)    8. Encounter for lipid screening for cardiovascular disease    9. Need for tetanus, diphtheria, and acellular pertussis (Tdap) vaccine          Plan:       Akilah was seen today for annual exam and  anemia.    Diagnoses and all orders for this visit:    Annual physical exam    Iron deficiency anemia, unspecified iron deficiency anemia type  Comments:  2/2 menorrhagia. Repeat iron panel. Start iron BID. Will make appt with OBGYN on the [employer] to discuss hyst.  Orders:  -     Ferritin; Future  -     Iron and TIBC; Future  -     Vitamin B12; Future  -     CBC auto differential; Future  -     ferrous sulfate 325 (65 FE) MG EC tablet; Take 1 tablet (325 mg total) by mouth 2 (two) times daily.    Menorrhagia with regular cycle  Comments:  Worsened.  As above    Essential hypertension  Comments:  Well controlled without medications.  Monitor.  Orders:  -     Comprehensive metabolic panel; Future    Multiple thyroid nodules  Comments:  Has had stable thyroid ultrasounds.  Will check annual TSH.  Orders:  -     TSH; Future    Obesity (BMI 30.0-34.9)  Comments:  Discussed exercise regularly.    Screening for HIV (human immunodeficiency virus)  -     HIV 1/2 Ag/Ab (4th Gen); Future    Encounter for lipid screening for cardiovascular disease  -     Lipid Panel; Future    Need for tetanus, diphtheria, and acellular pertussis (Tdap) vaccine  Comments:  Unsure last Tdap.  Advised to obtain vaccine at [facility]    Other orders  -     Cancel: Tissue Transglutaminase IgG; Future  -     Cancel: Ambulatory referral/consult to Obstetrics / Gynecology; Future  -     Cancel: Occult blood x 1, stool; Future  -     Cancel: Occult blood x 1, stool; Future  -     Cancel: Occult blood x 1, stool; Future         Side effects of medication(s) were discussed in detail and patient voiced understanding.  Patient will call back for any issues or complications.     RTC in 1 year or sooner PRN for annual.

## 2020-05-13 DIAGNOSIS — E53.8 CYANOCOBALAMIN DEFICIENCY: ICD-10-CM

## 2020-05-13 LAB
FERRITIN SERPL-MCNC: 3 NG/ML (ref 20–300)
HIV 1+2 AB+HIV1 P24 AG SERPL QL IA: NEGATIVE
VIT B12 SERPL-MCNC: 253 PG/ML (ref 210–950)

## 2020-05-13 RX ORDER — PNV NO.95/FERROUS FUM/FOLIC AC 28MG-0.8MG
100 TABLET ORAL DAILY
Qty: 90 TABLET | Refills: 3 | Status: SHIPPED | OUTPATIENT
Start: 2020-05-13 | End: 2021-05-13

## 2020-05-25 ENCOUNTER — PATIENT OUTREACH (OUTPATIENT)
Dept: ADMINISTRATIVE | Facility: OTHER | Age: 40
End: 2020-05-25

## 2020-05-27 ENCOUNTER — OFFICE VISIT (OUTPATIENT)
Dept: OBSTETRICS AND GYNECOLOGY | Facility: CLINIC | Age: 40
End: 2020-05-27
Payer: COMMERCIAL

## 2020-05-27 ENCOUNTER — HOSPITAL ENCOUNTER (OUTPATIENT)
Dept: RADIOLOGY | Facility: HOSPITAL | Age: 40
Discharge: HOME | End: 2020-05-27
Attending: OBSTETRICS & GYNECOLOGY
Payer: COMMERCIAL

## 2020-05-27 VITALS
DIASTOLIC BLOOD PRESSURE: 88 MMHG | SYSTOLIC BLOOD PRESSURE: 146 MMHG | HEIGHT: 65 IN | WEIGHT: 205 LBS | BODY MASS INDEX: 34.16 KG/M2

## 2020-05-27 DIAGNOSIS — D25.1 FIBROIDS, INTRAMURAL: ICD-10-CM

## 2020-05-27 DIAGNOSIS — Z01.419 GYNECOLOGIC EXAM NORMAL: Primary | ICD-10-CM

## 2020-05-27 DIAGNOSIS — N92.0 MENORRHAGIA WITH REGULAR CYCLE: ICD-10-CM

## 2020-05-27 PROCEDURE — 3077F PR MOST RECENT SYSTOLIC BLOOD PRESSURE >= 140 MM HG: ICD-10-PCS | Mod: CPTII,S$GLB,, | Performed by: OBSTETRICS & GYNECOLOGY

## 2020-05-27 PROCEDURE — 88175 CYTOPATH C/V AUTO FLUID REDO: CPT

## 2020-05-27 PROCEDURE — 76830 TRANSVAGINAL US NON-OB: CPT | Mod: TC

## 2020-05-27 PROCEDURE — 3079F PR MOST RECENT DIASTOLIC BLOOD PRESSURE 80-89 MM HG: ICD-10-PCS | Mod: CPTII,S$GLB,, | Performed by: OBSTETRICS & GYNECOLOGY

## 2020-05-27 PROCEDURE — 76856 US PELVIS COMP WITH TRANSVAG NON-OB (XPD): ICD-10-PCS | Mod: 26,,, | Performed by: RADIOLOGY

## 2020-05-27 PROCEDURE — 99385 PREV VISIT NEW AGE 18-39: CPT | Mod: S$GLB,,, | Performed by: OBSTETRICS & GYNECOLOGY

## 2020-05-27 PROCEDURE — 76856 US EXAM PELVIC COMPLETE: CPT | Mod: 26,,, | Performed by: RADIOLOGY

## 2020-05-27 PROCEDURE — 76830 TRANSVAGINAL US NON-OB: CPT | Mod: 26,,, | Performed by: RADIOLOGY

## 2020-05-27 PROCEDURE — 99999 PR PBB SHADOW E&M-EST. PATIENT-LVL III: CPT | Mod: PBBFAC,,, | Performed by: OBSTETRICS & GYNECOLOGY

## 2020-05-27 PROCEDURE — 99999 PR PBB SHADOW E&M-EST. PATIENT-LVL III: ICD-10-PCS | Mod: PBBFAC,,, | Performed by: OBSTETRICS & GYNECOLOGY

## 2020-05-27 PROCEDURE — 99385 PR PREVENTIVE VISIT,NEW,18-39: ICD-10-PCS | Mod: S$GLB,,, | Performed by: OBSTETRICS & GYNECOLOGY

## 2020-05-27 PROCEDURE — 87624 HPV HI-RISK TYP POOLED RSLT: CPT

## 2020-05-27 PROCEDURE — 3079F DIAST BP 80-89 MM HG: CPT | Mod: CPTII,S$GLB,, | Performed by: OBSTETRICS & GYNECOLOGY

## 2020-05-27 PROCEDURE — 76830 US PELVIS COMP WITH TRANSVAG NON-OB (XPD): ICD-10-PCS | Mod: 26,,, | Performed by: RADIOLOGY

## 2020-05-27 PROCEDURE — 3077F SYST BP >= 140 MM HG: CPT | Mod: CPTII,S$GLB,, | Performed by: OBSTETRICS & GYNECOLOGY

## 2020-05-27 NOTE — PROGRESS NOTES
Subjective:       Patient ID: Akilah Parker is a 39 y.o. female.    Chief Complaint:  Gynecologic Exam      History of Present Illness  HPI  Annual Exam-Premenopausal  Patient presents for annual exam. The patient is sexually active. GYN screening history: last pap: approximate date 2013 and was normal. The patient wears seatbelts: yes. The patient participates in regular exercise: not asked. Has the patient ever been transfused or tattooed?: yes. The patient reports that there is not domestic violence in her life.    Pt c/o prolonged, heavy menses lasting 7-8 days.  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  Pt is known to have fibroids.  Pt has tubal so pt does not desire future fertility.  2 prior 's.  Pt is enquiring about hysterectomy.                  GYN & OB History  Patient's last menstrual period was 2020.   Date of Last Pap: 2013    OB History    Para Term  AB Living   2 2       2   SAB TAB Ectopic Multiple Live Births           2      # Outcome Date GA Lbr Michael/2nd Weight Sex Delivery Anes PTL Lv   2 Para      Vag-Spont   ASTER   1 Para      Vag-Spont   ASTER       Review of Systems  Review of Systems   Constitutional: Negative.    Eyes: Negative.    Respiratory: Negative.    Cardiovascular: Negative.    Gastrointestinal: Negative.    Endocrine: Negative.    Genitourinary: Positive for dysmenorrhea, menorrhagia and menstrual problem. Negative for pelvic pain, urgency, vaginal bleeding, vaginal discharge, vaginal pain, postcoital bleeding, postmenopausal bleeding, vaginal dryness and vaginal odor.   Musculoskeletal: Negative.    Neurological: Negative.    Hematological: Negative.    Psychiatric/Behavioral: Negative.    Breast: negative.            Objective:    Physical Exam:   Constitutional: She is oriented to person, place, and time. She appears well-developed and well-nourished. No distress.    HENT:   Head: Normocephalic  and atraumatic.     Neck: Normal range of motion.    Cardiovascular: Normal rate.     Pulmonary/Chest: Effort normal. No respiratory distress. Right breast exhibits no inverted nipple, no mass, no nipple discharge, no skin change, no tenderness, presence, no bleeding and no swelling. Left breast exhibits no inverted nipple, no mass, no nipple discharge, no skin change, no tenderness, presence, no bleeding and no swelling.        Abdominal: Soft. She exhibits no distension and no mass. There is no tenderness. There is no rebound and no guarding.     Genitourinary: Vagina normal. Pelvic exam was performed with patient supine. There is no rash, tenderness, lesion or injury on the right labia. There is no rash, tenderness, lesion or injury on the left labia. Uterus is enlarged and hosting fibroids. Uterus is not deviated, not fixed, present and not experiencing uterine prolapse. Cervix is normal. Right adnexum displays no mass, no tenderness and no fullness. Left adnexum displays no mass, no tenderness and no fullness. Labial bartholins normal.          Musculoskeletal: Normal range of motion and moves all extremeties. She exhibits no tenderness.       Neurological: She is alert and oriented to person, place, and time. No cranial nerve deficit. Coordination normal.    Skin: She is not diaphoretic.    Psychiatric: She has a normal mood and affect. Her behavior is normal. Judgment and thought content normal.          Assessment:        1. Gynecologic exam normal    2. Menorrhagia with regular cycle    3. Fibroids, intramural               Plan:      1.  Pelvic sono ordered  2.  Pap w/ HR HPV collected  3.  Pt to call when she desires surgery, but she desires within next month or 2

## 2020-05-28 ENCOUNTER — TELEPHONE (OUTPATIENT)
Dept: OBSTETRICS AND GYNECOLOGY | Facility: HOSPITAL | Age: 40
End: 2020-05-28

## 2020-05-28 ENCOUNTER — TELEPHONE (OUTPATIENT)
Dept: OBSTETRICS AND GYNECOLOGY | Facility: CLINIC | Age: 40
End: 2020-05-28

## 2020-05-28 DIAGNOSIS — D50.9 IRON DEFICIENCY ANEMIA, UNSPECIFIED IRON DEFICIENCY ANEMIA TYPE: ICD-10-CM

## 2020-05-28 DIAGNOSIS — N92.0 MENORRHAGIA WITH REGULAR CYCLE: ICD-10-CM

## 2020-05-28 DIAGNOSIS — D25.1 FIBROIDS, INTRAMURAL: Primary | ICD-10-CM

## 2020-05-28 NOTE — TELEPHONE ENCOUNTER
----- Message from Makeda Marrero sent at 5/28/2020 12:41 PM CDT -----  Contact: ENRIQUE STARR [[id number]]  Name of Who is Calling: ENRIQUE STARR [[id number]]    What is the request in detail: Would like to speak with staff to schedule surgery. Please contact to further discuss and advise      Can the clinic reply by [facility]: yes    What [facility] Back [facility]: [phone]          Patient is aware that I will work on her surg date requested 8/4/2020 along with Dr Oliva

## 2020-05-29 NOTE — TELEPHONE ENCOUNTER
----- Message from Denice Wetzel MA sent at 5/28/2020  2:20 PM CDT -----  Contact: ENRIQUE STARR [[id number]]  Pt is requesting Aug 4 for hysterectomy. Have you looked at the US report to see if she is good to go and if so is it TLH  ----- Message -----  From: Makeda Marrero  Sent: 5/28/2020  12:41 PM CDT  To: Hazel FORD Staff    Name of Who is Calling: ENRIQUE STARR [[id number]]    What is the request in detail: Would like to speak with staff to schedule surgery. Please contact to further discuss and advise      Can the clinic reply by [facility]: yes    What Number to [facility] Back [facility]: [phone]

## 2020-06-01 LAB
FINAL PATHOLOGIC DIAGNOSIS: NORMAL
Lab: NORMAL

## 2020-06-02 LAB
HPV HR 12 DNA SPEC QL NAA+PROBE: NEGATIVE
HPV16 AG SPEC QL: NEGATIVE
HPV18 DNA SPEC QL NAA+PROBE: NEGATIVE

## 2020-06-10 ENCOUNTER — PATIENT MESSAGE (OUTPATIENT)
Dept: INTERNAL MEDICINE | Facility: CLINIC | Age: 40
End: 2020-06-10

## 2020-06-10 DIAGNOSIS — I10 ESSENTIAL HYPERTENSION: Primary | ICD-10-CM

## 2020-06-10 DIAGNOSIS — R60.0 BILATERAL LEG EDEMA: ICD-10-CM

## 2020-06-10 RX ORDER — HYDROCHLOROTHIAZIDE 12.5 MG/1
12.5 TABLET ORAL DAILY
Qty: 30 TABLET | Refills: 11 | Status: SHIPPED | OUTPATIENT
Start: 2020-06-10 | End: 2021-07-23 | Stop reason: SDUPTHER

## 2020-07-24 ENCOUNTER — PATIENT OUTREACH (OUTPATIENT)
Dept: ADMINISTRATIVE | Facility: OTHER | Age: 40
End: 2020-07-24

## 2020-07-24 NOTE — PROGRESS NOTES
Requested updates within Care Everywhere.  Patient's chart was reviewed for overdue MIKE topics.  Immunizations reconciled.

## 2020-07-27 ENCOUNTER — OFFICE VISIT (OUTPATIENT)
Dept: OBSTETRICS AND GYNECOLOGY | Facility: CLINIC | Age: 40
End: 2020-07-27
Payer: COMMERCIAL

## 2020-07-27 ENCOUNTER — HOSPITAL ENCOUNTER (OUTPATIENT)
Dept: PREADMISSION TESTING | Facility: HOSPITAL | Age: 40
Discharge: HOME | End: 2020-07-27
Attending: OBSTETRICS & GYNECOLOGY
Payer: COMMERCIAL

## 2020-07-27 ENCOUNTER — ANESTHESIA EVENT (OUTPATIENT)
Dept: SURGERY | Facility: HOSPITAL | Age: 40
End: 2020-07-27
Payer: COMMERCIAL

## 2020-07-27 VITALS
DIASTOLIC BLOOD PRESSURE: 90 MMHG | WEIGHT: 203 LBS | SYSTOLIC BLOOD PRESSURE: 133 MMHG | HEIGHT: 65 IN | BODY MASS INDEX: 33.82 KG/M2

## 2020-07-27 DIAGNOSIS — Z01.818 PRE-OP EXAMINATION: ICD-10-CM

## 2020-07-27 DIAGNOSIS — D25.1 FIBROIDS, INTRAMURAL: ICD-10-CM

## 2020-07-27 DIAGNOSIS — N92.0 MENORRHAGIA WITH REGULAR CYCLE: ICD-10-CM

## 2020-07-27 DIAGNOSIS — D25.1 FIBROIDS, INTRAMURAL: Primary | ICD-10-CM

## 2020-07-27 LAB
ALBUMIN SERPL BCP-MCNC: 3.4 G/DL (ref 3.5–5.2)
ALP SERPL-CCNC: 62 U/L (ref 55–135)
ALT SERPL W/O P-5'-P-CCNC: 6 U/L (ref 10–44)
ANION GAP SERPL CALC-SCNC: 4 MMOL/L (ref 8–16)
AST SERPL-CCNC: 12 U/L (ref 10–40)
BASOPHILS # BLD AUTO: 0.02 K/UL (ref 0–0.2)
BASOPHILS NFR BLD: 0.6 % (ref 0–1.9)
BILIRUB SERPL-MCNC: 0.3 MG/DL (ref 0.1–1)
BUN SERPL-MCNC: 12 MG/DL (ref 6–20)
CALCIUM SERPL-MCNC: 8.4 MG/DL (ref 8.7–10.5)
CHLORIDE SERPL-SCNC: 107 MMOL/L (ref 95–110)
CO2 SERPL-SCNC: 28 MMOL/L (ref 23–29)
CREAT SERPL-MCNC: 1 MG/DL (ref 0.5–1.4)
DIFFERENTIAL METHOD: ABNORMAL
EOSINOPHIL # BLD AUTO: 0.1 K/UL (ref 0–0.5)
EOSINOPHIL NFR BLD: 3.1 % (ref 0–8)
ERYTHROCYTE [DISTWIDTH] IN BLOOD BY AUTOMATED COUNT: 19.3 % (ref 11.5–14.5)
EST. GFR  (AFRICAN AMERICAN): >60 ML/MIN/1.73 M^2
EST. GFR  (NON AFRICAN AMERICAN): >60 ML/MIN/1.73 M^2
GLUCOSE SERPL-MCNC: 77 MG/DL (ref 70–110)
HCT VFR BLD AUTO: 29 % (ref 37–48.5)
HGB BLD-MCNC: 8.6 G/DL (ref 12–16)
IMM GRANULOCYTES # BLD AUTO: 0 K/UL (ref 0–0.04)
IMM GRANULOCYTES NFR BLD AUTO: 0 % (ref 0–0.5)
LYMPHOCYTES # BLD AUTO: 1.6 K/UL (ref 1–4.8)
LYMPHOCYTES NFR BLD: 43.6 % (ref 18–48)
MCH RBC QN AUTO: 23.1 PG (ref 27–31)
MCHC RBC AUTO-ENTMCNC: 29.7 G/DL (ref 32–36)
MCV RBC AUTO: 78 FL (ref 82–98)
MONOCYTES # BLD AUTO: 0.3 K/UL (ref 0.3–1)
MONOCYTES NFR BLD: 8.9 % (ref 4–15)
NEUTROPHILS # BLD AUTO: 1.6 K/UL (ref 1.8–7.7)
NEUTROPHILS NFR BLD: 43.8 % (ref 38–73)
NRBC BLD-RTO: 0 /100 WBC
PLATELET # BLD AUTO: 358 K/UL (ref 150–350)
PMV BLD AUTO: 11.8 FL (ref 9.2–12.9)
POTASSIUM SERPL-SCNC: 3.7 MMOL/L (ref 3.5–5.1)
PROT SERPL-MCNC: 6.9 G/DL (ref 6–8.4)
RBC # BLD AUTO: 3.73 M/UL (ref 4–5.4)
SODIUM SERPL-SCNC: 139 MMOL/L (ref 136–145)
WBC # BLD AUTO: 3.58 K/UL (ref 3.9–12.7)

## 2020-07-27 PROCEDURE — 83036 HEMOGLOBIN GLYCOSYLATED A1C: CPT

## 2020-07-27 PROCEDURE — 93010 EKG 12-LEAD: ICD-10-PCS | Mod: ,,, | Performed by: INTERNAL MEDICINE

## 2020-07-27 PROCEDURE — 93010 ELECTROCARDIOGRAM REPORT: CPT | Mod: ,,, | Performed by: INTERNAL MEDICINE

## 2020-07-27 PROCEDURE — 85025 COMPLETE CBC W/AUTO DIFF WBC: CPT

## 2020-07-27 PROCEDURE — 99999 PR PBB SHADOW E&M-EST. PATIENT-LVL II: ICD-10-PCS | Mod: PBBFAC,,, | Performed by: OBSTETRICS & GYNECOLOGY

## 2020-07-27 PROCEDURE — 93005 ELECTROCARDIOGRAM TRACING: CPT

## 2020-07-27 PROCEDURE — 99499 NO LOS: ICD-10-PCS | Mod: S$GLB,,, | Performed by: OBSTETRICS & GYNECOLOGY

## 2020-07-27 PROCEDURE — 99499 UNLISTED E&M SERVICE: CPT | Mod: S$GLB,,, | Performed by: OBSTETRICS & GYNECOLOGY

## 2020-07-27 PROCEDURE — 80053 COMPREHEN METABOLIC PANEL: CPT

## 2020-07-27 PROCEDURE — 99999 PR PBB SHADOW E&M-EST. PATIENT-LVL II: CPT | Mod: PBBFAC,,, | Performed by: OBSTETRICS & GYNECOLOGY

## 2020-07-27 RX ORDER — MUPIROCIN 20 MG/G
OINTMENT TOPICAL
Status: CANCELLED | OUTPATIENT
Start: 2020-07-27

## 2020-07-27 RX ORDER — SODIUM CHLORIDE, SODIUM LACTATE, POTASSIUM CHLORIDE, CALCIUM CHLORIDE 600; 310; 30; 20 MG/100ML; MG/100ML; MG/100ML; MG/100ML
INJECTION, SOLUTION INTRAVENOUS CONTINUOUS
Status: CANCELLED | OUTPATIENT
Start: 2020-07-27

## 2020-07-27 RX ORDER — NAPROXEN SODIUM 220 MG
220 TABLET ORAL EVERY 12 HOURS PRN
Status: ON HOLD | COMMUNITY
End: 2020-08-05 | Stop reason: HOSPADM

## 2020-07-27 NOTE — DISCHARGE INSTRUCTIONS
Your procedure  is scheduled for __Wednesday AUGUST 5, 2020________.    Call [phone] between 2pm and 5pm on _Tuesday AUGUST 4, 2020______to find out your arrival time for the day of surgery.    Report to the Emergency Department on the day of your surgery.  You will be escorted to the admitting unit.    Important instructions:   Do not eat or drink after 12 midnight, including water.  It is okay to brush your teeth.                                                                                                                                                                                         Do not have gum, candy or mints.    DO NOT TAKE ANY MEDICATIONS THE AM OF SURGERY      Stop taking Aspirin, Ibuprofen, Motrin and Aleve , Fish oil, and Vitamin E for at least 7 days before your surgery.                                                                                                                                                                                  You may use Tylenol unless otherwise instructed by your doctor.          Return to patient registration through the Emergency Room as previously on_MONDAY  AUGUST 3   @  10:00__ for  Covid test. And Type and screen     Prep instructions:    SHOWER   OTHER_____________     Please shower the night before and the morning of your surgery.      Use Hibiclens soap as instructed by your pre op nurse.   Please place clean linens on your bed the night before surgery. Please wear fresh clean clothing after each shower.     No shaving of procedural area at least 4-5 days before surgery due to increased risk of skin irritation and/or possible infection.      You may be asked to take a third shower on arrival to Same Day Surgery depending on the type of surgery you are having.     Female patients may be asked for a urine specimen on the morning of the surgery.  Please check with your nurse before using the restroom.     Do not wear make- up, including  mascara.     You may wear deodorant only.      Do not wear powder, body lotion or perfume/cologne.     Do not wear any jewelry or have any metal on your body.     If you are going home on the same day of surgery, you must arrange for a family member or a friend to drive you home.  Public transportation is prohibited.  You will not be able to drive home if you were given anesthesia or sedation.     Wear loose fitting clothes allowing for bandages.     Please leave money and valuables home.       You may bring your cell phone.     Call the doctor if fever or illness should occur before your surgery.    Call [phone] to contact us here if needed.

## 2020-07-28 LAB
ESTIMATED AVG GLUCOSE: 105 MG/DL (ref 68–131)
HBA1C MFR BLD HPLC: 5.3 % (ref 4–5.6)

## 2020-07-28 NOTE — PROGRESS NOTES
Cc:  Pre-op [facility]    HPI:  Pt c/o prolonged, heavy menses lasting 7-8 days.  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  Pt is known to have fibroids.  Pt has tubal so pt does not desire future fertility.  2 prior 's    Pelvic sono:  Uterus:  Size: 12 x 9 x 9 cm  Masses: There are multiple heterogeneously echogenic masses throughout the uterus, the largest of which measures 7 x 7.4 x 6.7 cm and is located at the level of the fundus.  Endometrium: Thickened in this pre menopausal patient, measuring 17 mm.     Right ovary:  Not visualized.     Left ovary:  Size: 4 x 3 x 4 cm  Appearance: Normal  Vascular Flow: Normal.     Free Fluid:  There is a trace volume of pelvic free fluid.     Impression:   1. Multiple uterine fibroids.  2. Thickened endometrium measuring up to 17 mm.  3. Nonvisualization of the right ovary.    Past Medical History:   Diagnosis Date    Acne vulgaris     Ganglion cyst     bilateral     Hypertension     IN PAST    CHARISSE (iron deficiency anemia)     Kidney stone     Thyroid nodule     Scattered micro nodules, stable in size compared to      Past Surgical History:   Procedure Laterality Date    ORIF FOOT FRACTURE      right ankle    OTHER SURGICAL HISTORY      retrieved kidney stone without lithotripsy    TUBAL LIGATION       Review of patient's allergies indicates:  No Known Allergies    Patient's Medications   New Prescriptions    No medications on file   Previous Medications    CYANOCOBALAMIN (VITAMIN B-12) 100 MCG TABLET    Take 1 tablet (100 mcg total) by mouth once daily.    FERROUS SULFATE 325 (65 FE) MG EC TABLET    Take 1 tablet (325 mg total) by mouth 2 (two) times daily.    HYDROCHLOROTHIAZIDE (HYDRODIURIL) 12.5 MG TAB    Take 1 tablet (12.5 mg total) by mouth once daily.    NAPROXEN SODIUM (ANAPROX) 220 MG TABLET    Take 220 mg by mouth every 12 (twelve) hours as needed.   Modified Medications    No medications on file  "  Discontinued Medications    No medications on file     Family History   Problem Relation Age of Onset    Hyperlipidemia Father     Hypertension Father     Stroke Father     Aneurysm Maternal Grandmother     Heart disease Maternal Grandfather     Heart disease Paternal Grandmother     Hypertension Mother     Colon polyps Mother         40s, precancerous    Acne Neg Hx     Eczema Neg Hx     Melanoma Neg Hx     Breast cancer Neg Hx     Colon cancer Neg Hx     Ovarian cancer Neg Hx     Diabetes Neg Hx      Social History     Tobacco Use    Smoking status: Never Smoker    Smokeless tobacco: Never Used   Substance Use Topics    Alcohol use: Yes     Comment: occassionally    Drug use: No     Review of Systems   Constitutional: Negative.    HENT: Negative.    Eyes: Negative.    Respiratory: Negative.    Cardiovascular: Negative.    Gastrointestinal: Negative.    Genitourinary: Negative.    Musculoskeletal: Negative.    Neurological: Negative.    Endo/Heme/Allergies: Negative.    Psychiatric/Behavioral: Negative.      Vitals:    07/27/20 1319   BP: (!) 133/90   Weight: 92.1 kg (203 lb)   Height: 5' 5" (1.651 m)     Physical Exam  Vitals signs reviewed.   Constitutional:       General: She is not in acute distress.     Appearance: Normal appearance. She is normal weight. She is not ill-appearing, toxic-appearing or diaphoretic.   HENT:      Head: Normocephalic.   Neck:      Musculoskeletal: Normal range of motion. No neck rigidity or muscular tenderness.   Cardiovascular:      Rate and Rhythm: Normal rate.   Pulmonary:      Effort: Pulmonary effort is normal. No respiratory distress.   Abdominal:      General: Abdomen is flat. There is no distension.      Palpations: There is no mass.      Tenderness: There is no abdominal tenderness. There is no guarding or rebound.      Hernia: No hernia is present.   Musculoskeletal: Normal range of motion.         General: No swelling, tenderness or signs of injury. "   Skin:     General: Skin is warm.   Neurological:      General: No focal deficit present.      Mental Status: She is alert and oriented to person, place, and time.      Cranial Nerves: No cranial nerve deficit.      Motor: No weakness.      Coordination: Coordination normal.   Psychiatric:         Mood and Affect: Mood normal.         Behavior: Behavior normal.         Thought Content: Thought content normal.         Judgment: Judgment normal.     Assessment/Plan  1. Fibroids, intramural    2. Pre-op examination    3. Menorrhagia with regular cycle        Pt consented [facility], all questions answered, consents signed.

## 2020-07-28 NOTE — H&P (VIEW-ONLY)
Cc:  Pre-op [facility]    HPI:  Pt c/o prolonged, heavy menses lasting 7-8 days.  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  Pt is known to have fibroids.  Pt has tubal so pt does not desire future fertility.  2 prior 's    Pelvic sono:  Uterus:  Size: 12 x 9 x 9 cm  Masses: There are multiple heterogeneously echogenic masses throughout the uterus, the largest of which measures 7 x 7.4 x 6.7 cm and is located at the level of the fundus.  Endometrium: Thickened in this pre menopausal patient, measuring 17 mm.     Right ovary:  Not visualized.     Left ovary:  Size: 4 x 3 x 4 cm  Appearance: Normal  Vascular Flow: Normal.     Free Fluid:  There is a trace volume of pelvic free fluid.     Impression:   1. Multiple uterine fibroids.  2. Thickened endometrium measuring up to 17 mm.  3. Nonvisualization of the right ovary.    Past Medical History:   Diagnosis Date    Acne vulgaris     Ganglion cyst     bilateral     Hypertension     IN PAST    CHARISSE (iron deficiency anemia)     Kidney stone     Thyroid nodule     Scattered micro nodules, stable in size compared to      Past Surgical History:   Procedure Laterality Date    ORIF FOOT FRACTURE      right ankle    OTHER SURGICAL HISTORY      retrieved kidney stone without lithotripsy    TUBAL LIGATION       Review of patient's allergies indicates:  No Known Allergies    Patient's Medications   New Prescriptions    No medications on file   Previous Medications    CYANOCOBALAMIN (VITAMIN B-12) 100 MCG TABLET    Take 1 tablet (100 mcg total) by mouth once daily.    FERROUS SULFATE 325 (65 FE) MG EC TABLET    Take 1 tablet (325 mg total) by mouth 2 (two) times daily.    HYDROCHLOROTHIAZIDE (HYDRODIURIL) 12.5 MG TAB    Take 1 tablet (12.5 mg total) by mouth once daily.    NAPROXEN SODIUM (ANAPROX) 220 MG TABLET    Take 220 mg by mouth every 12 (twelve) hours as needed.   Modified Medications    No medications on file  "  Discontinued Medications    No medications on file     Family History   Problem Relation Age of Onset    Hyperlipidemia Father     Hypertension Father     Stroke Father     Aneurysm Maternal Grandmother     Heart disease Maternal Grandfather     Heart disease Paternal Grandmother     Hypertension Mother     Colon polyps Mother         40s, precancerous    Acne Neg Hx     Eczema Neg Hx     Melanoma Neg Hx     Breast cancer Neg Hx     Colon cancer Neg Hx     Ovarian cancer Neg Hx     Diabetes Neg Hx      Social History     Tobacco Use    Smoking status: Never Smoker    Smokeless tobacco: Never Used   Substance Use Topics    Alcohol use: Yes     Comment: occassionally    Drug use: No     Review of Systems   Constitutional: Negative.    HENT: Negative.    Eyes: Negative.    Respiratory: Negative.    Cardiovascular: Negative.    Gastrointestinal: Negative.    Genitourinary: Negative.    Musculoskeletal: Negative.    Neurological: Negative.    Endo/Heme/Allergies: Negative.    Psychiatric/Behavioral: Negative.      Vitals:    07/27/20 1319   BP: (!) 133/90   Weight: 92.1 kg (203 lb)   Height: 5' 5" (1.651 m)     Physical Exam  Vitals signs reviewed.   Constitutional:       General: She is not in acute distress.     Appearance: Normal appearance. She is normal weight. She is not ill-appearing, toxic-appearing or diaphoretic.   HENT:      Head: Normocephalic.   Neck:      Musculoskeletal: Normal range of motion. No neck rigidity or muscular tenderness.   Cardiovascular:      Rate and Rhythm: Normal rate.   Pulmonary:      Effort: Pulmonary effort is normal. No respiratory distress.   Abdominal:      General: Abdomen is flat. There is no distension.      Palpations: There is no mass.      Tenderness: There is no abdominal tenderness. There is no guarding or rebound.      Hernia: No hernia is present.   Musculoskeletal: Normal range of motion.         General: No swelling, tenderness or signs of injury. "   Skin:     General: Skin is warm.   Neurological:      General: No focal deficit present.      Mental Status: She is alert and oriented to person, place, and time.      Cranial Nerves: No cranial nerve deficit.      Motor: No weakness.      Coordination: Coordination normal.   Psychiatric:         Mood and Affect: Mood normal.         Behavior: Behavior normal.         Thought Content: Thought content normal.         Judgment: Judgment normal.     Assessment/Plan  1. Fibroids, intramural    2. Pre-op examination    3. Menorrhagia with regular cycle        Pt consented [facility], all questions answered, consents signed.

## 2020-08-03 ENCOUNTER — HOSPITAL ENCOUNTER (OUTPATIENT)
Dept: PREADMISSION TESTING | Facility: HOSPITAL | Age: 40
Discharge: HOME | End: 2020-08-03
Attending: OBSTETRICS & GYNECOLOGY
Payer: COMMERCIAL

## 2020-08-03 DIAGNOSIS — Z01.818 PRE-OP TESTING: ICD-10-CM

## 2020-08-03 LAB
ABO + RH BLD: NORMAL
BLD GP AB SCN CELLS X3 SERPL QL: NORMAL
SARS-COV-2 RDRP RESP QL NAA+PROBE: NEGATIVE

## 2020-08-03 PROCEDURE — U0002 COVID-19 LAB TEST NON-CDC: HCPCS

## 2020-08-03 PROCEDURE — 36415 COLL VENOUS BLD VENIPUNCTURE: CPT

## 2020-08-03 PROCEDURE — 86850 RBC ANTIBODY SCREEN: CPT

## 2020-08-05 ENCOUNTER — ANESTHESIA (OUTPATIENT)
Dept: SURGERY | Facility: HOSPITAL | Age: 40
End: 2020-08-05
Payer: COMMERCIAL

## 2020-08-05 ENCOUNTER — TELEPHONE (OUTPATIENT)
Dept: OBSTETRICS AND GYNECOLOGY | Facility: CLINIC | Age: 40
End: 2020-08-05

## 2020-08-05 ENCOUNTER — HOSPITAL ENCOUNTER (OUTPATIENT)
Facility: HOSPITAL | Age: 40
Discharge: HOME | End: 2020-08-06
Attending: OBSTETRICS & GYNECOLOGY | Admitting: OBSTETRICS & GYNECOLOGY
Payer: COMMERCIAL

## 2020-08-05 DIAGNOSIS — D25.1 FIBROIDS, INTRAMURAL: ICD-10-CM

## 2020-08-05 DIAGNOSIS — Z01.818 PRE-OP TESTING: ICD-10-CM

## 2020-08-05 DIAGNOSIS — Z01.818 PRE-OP EXAMINATION: ICD-10-CM

## 2020-08-05 DIAGNOSIS — N92.0 MENORRHAGIA WITH REGULAR CYCLE: ICD-10-CM

## 2020-08-05 DIAGNOSIS — Z90.710 S/P TOTAL HYSTERECTOMY: Primary | ICD-10-CM

## 2020-08-05 LAB
B-HCG UR QL: NEGATIVE
POCT GLUCOSE: 94 MG/DL (ref 70–110)

## 2020-08-05 PROCEDURE — 37000009 HC ANESTHESIA EA ADD 15 MINS: Performed by: OBSTETRICS & GYNECOLOGY

## 2020-08-05 PROCEDURE — 63600175 PHARM REV CODE 636 W HCPCS: Performed by: OBSTETRICS & GYNECOLOGY

## 2020-08-05 PROCEDURE — 71000033 HC RECOVERY, INTIAL HOUR: Performed by: OBSTETRICS & GYNECOLOGY

## 2020-08-05 PROCEDURE — 58573 PR LAPAROSCOPY TOT HYSTERECTOMY UTERUS >250 GRAM W TUBE/OVARY: ICD-10-PCS | Mod: ,,, | Performed by: OBSTETRICS & GYNECOLOGY

## 2020-08-05 PROCEDURE — 63600175 PHARM REV CODE 636 W HCPCS: Performed by: STUDENT IN AN ORGANIZED HEALTH CARE EDUCATION/TRAINING PROGRAM

## 2020-08-05 PROCEDURE — 58573 PR LAPAROSCOPY TOT HYSTERECTOMY UTERUS >250 GRAM W TUBE/OVARY: ICD-10-PCS | Mod: 80,,, | Performed by: OBSTETRICS & GYNECOLOGY

## 2020-08-05 PROCEDURE — 63600175 PHARM REV CODE 636 W HCPCS: Performed by: ANESTHESIOLOGY

## 2020-08-05 PROCEDURE — 25000003 PHARM REV CODE 250: Performed by: STUDENT IN AN ORGANIZED HEALTH CARE EDUCATION/TRAINING PROGRAM

## 2020-08-05 PROCEDURE — 71000016 HC POSTOP RECOV ADDL HR: Performed by: OBSTETRICS & GYNECOLOGY

## 2020-08-05 PROCEDURE — 88307 TISSUE EXAM BY PATHOLOGIST: CPT | Performed by: PATHOLOGY

## 2020-08-05 PROCEDURE — 58573 TLH W/T/O UTERUS OVER 250 G: CPT | Mod: ,,, | Performed by: OBSTETRICS & GYNECOLOGY

## 2020-08-05 PROCEDURE — 82962 GLUCOSE BLOOD TEST: CPT | Performed by: OBSTETRICS & GYNECOLOGY

## 2020-08-05 PROCEDURE — 81025 URINE PREGNANCY TEST: CPT

## 2020-08-05 PROCEDURE — 27201423 OPTIME MED/SURG SUP & DEVICES STERILE SUPPLY: Performed by: OBSTETRICS & GYNECOLOGY

## 2020-08-05 PROCEDURE — 25000003 PHARM REV CODE 250: Performed by: OBSTETRICS & GYNECOLOGY

## 2020-08-05 PROCEDURE — 71000015 HC POSTOP RECOV 1ST HR: Performed by: OBSTETRICS & GYNECOLOGY

## 2020-08-05 PROCEDURE — D9220A PRA ANESTHESIA: ICD-10-PCS | Mod: CRNA,,, | Performed by: STUDENT IN AN ORGANIZED HEALTH CARE EDUCATION/TRAINING PROGRAM

## 2020-08-05 PROCEDURE — 36000711: Performed by: OBSTETRICS & GYNECOLOGY

## 2020-08-05 PROCEDURE — 37000008 HC ANESTHESIA 1ST 15 MINUTES: Performed by: OBSTETRICS & GYNECOLOGY

## 2020-08-05 PROCEDURE — 88307 PR  SURG PATH,LEVEL V: ICD-10-PCS | Mod: 26,,, | Performed by: PATHOLOGY

## 2020-08-05 PROCEDURE — D9220A PRA ANESTHESIA: Mod: ANES,,, | Performed by: ANESTHESIOLOGY

## 2020-08-05 PROCEDURE — 36000710: Performed by: OBSTETRICS & GYNECOLOGY

## 2020-08-05 PROCEDURE — D9220A PRA ANESTHESIA: ICD-10-PCS | Mod: ANES,,, | Performed by: ANESTHESIOLOGY

## 2020-08-05 PROCEDURE — 71000039 HC RECOVERY, EACH ADD'L HOUR: Performed by: OBSTETRICS & GYNECOLOGY

## 2020-08-05 PROCEDURE — 88307 TISSUE EXAM BY PATHOLOGIST: CPT | Mod: 26,,, | Performed by: PATHOLOGY

## 2020-08-05 PROCEDURE — D9220A PRA ANESTHESIA: Mod: CRNA,,, | Performed by: STUDENT IN AN ORGANIZED HEALTH CARE EDUCATION/TRAINING PROGRAM

## 2020-08-05 PROCEDURE — 58573 TLH W/T/O UTERUS OVER 250 G: CPT | Mod: 80,,, | Performed by: OBSTETRICS & GYNECOLOGY

## 2020-08-05 PROCEDURE — C2628 CATHETER, OCCLUSION: HCPCS | Performed by: OBSTETRICS & GYNECOLOGY

## 2020-08-05 PROCEDURE — C9113 INJ PANTOPRAZOLE SODIUM, VIA: HCPCS | Performed by: OBSTETRICS & GYNECOLOGY

## 2020-08-05 RX ORDER — ACETAMINOPHEN 10 MG/ML
1000 INJECTION, SOLUTION INTRAVENOUS ONCE
Status: COMPLETED | OUTPATIENT
Start: 2020-08-05 | End: 2020-08-05

## 2020-08-05 RX ORDER — HYDROCODONE BITARTRATE AND ACETAMINOPHEN 5; 325 MG/1; MG/1
1 TABLET ORAL EVERY 6 HOURS PRN
Qty: 24 TABLET | Refills: 0 | Status: SHIPPED | OUTPATIENT
Start: 2020-08-05 | End: 2021-07-23

## 2020-08-05 RX ORDER — PANTOPRAZOLE SODIUM 40 MG/10ML
40 INJECTION, POWDER, LYOPHILIZED, FOR SOLUTION INTRAVENOUS ONCE
Status: COMPLETED | OUTPATIENT
Start: 2020-08-05 | End: 2020-08-05

## 2020-08-05 RX ORDER — SODIUM CHLORIDE 0.9 G/100ML
IRRIGANT IRRIGATION
Status: DISCONTINUED | OUTPATIENT
Start: 2020-08-05 | End: 2020-08-05 | Stop reason: HOSPADM

## 2020-08-05 RX ORDER — FENTANYL CITRATE 50 UG/ML
INJECTION, SOLUTION INTRAMUSCULAR; INTRAVENOUS
Status: DISCONTINUED | OUTPATIENT
Start: 2020-08-05 | End: 2020-08-05

## 2020-08-05 RX ORDER — HYDROCODONE BITARTRATE AND ACETAMINOPHEN 5; 325 MG/1; MG/1
1 TABLET ORAL EVERY 4 HOURS PRN
Status: DISCONTINUED | OUTPATIENT
Start: 2020-08-05 | End: 2020-08-05

## 2020-08-05 RX ORDER — HYDROCODONE BITARTRATE AND ACETAMINOPHEN 10; 325 MG/1; MG/1
1 TABLET ORAL EVERY 4 HOURS PRN
Status: DISCONTINUED | OUTPATIENT
Start: 2020-08-05 | End: 2020-08-05

## 2020-08-05 RX ORDER — OXYCODONE AND ACETAMINOPHEN 10; 325 MG/1; MG/1
1 TABLET ORAL EVERY 4 HOURS PRN
Status: DISCONTINUED | OUTPATIENT
Start: 2020-08-05 | End: 2020-08-06 | Stop reason: HOSPADM

## 2020-08-05 RX ORDER — DIPHENHYDRAMINE HCL 25 MG
25 CAPSULE ORAL EVERY 4 HOURS PRN
Status: DISCONTINUED | OUTPATIENT
Start: 2020-08-05 | End: 2020-08-06 | Stop reason: HOSPADM

## 2020-08-05 RX ORDER — DIPHENHYDRAMINE HYDROCHLORIDE 50 MG/ML
25 INJECTION INTRAMUSCULAR; INTRAVENOUS EVERY 4 HOURS PRN
Status: DISCONTINUED | OUTPATIENT
Start: 2020-08-05 | End: 2020-08-06 | Stop reason: HOSPADM

## 2020-08-05 RX ORDER — DEXAMETHASONE SODIUM PHOSPHATE 4 MG/ML
INJECTION, SOLUTION INTRA-ARTICULAR; INTRALESIONAL; INTRAMUSCULAR; INTRAVENOUS; SOFT TISSUE
Status: DISCONTINUED | OUTPATIENT
Start: 2020-08-05 | End: 2020-08-05

## 2020-08-05 RX ORDER — IBUPROFEN 400 MG/1
800 TABLET ORAL 3 TIMES DAILY
Status: DISCONTINUED | OUTPATIENT
Start: 2020-08-05 | End: 2020-08-06 | Stop reason: HOSPADM

## 2020-08-05 RX ORDER — LABETALOL HYDROCHLORIDE 5 MG/ML
INJECTION, SOLUTION INTRAVENOUS
Status: DISCONTINUED | OUTPATIENT
Start: 2020-08-05 | End: 2020-08-05

## 2020-08-05 RX ORDER — SODIUM CHLORIDE, SODIUM LACTATE, POTASSIUM CHLORIDE, CALCIUM CHLORIDE 600; 310; 30; 20 MG/100ML; MG/100ML; MG/100ML; MG/100ML
INJECTION, SOLUTION INTRAVENOUS CONTINUOUS
Status: DISCONTINUED | OUTPATIENT
Start: 2020-08-05 | End: 2020-08-06 | Stop reason: HOSPADM

## 2020-08-05 RX ORDER — HYDROMORPHONE HYDROCHLORIDE 2 MG/ML
0.2 INJECTION, SOLUTION INTRAMUSCULAR; INTRAVENOUS; SUBCUTANEOUS EVERY 5 MIN PRN
Status: COMPLETED | OUTPATIENT
Start: 2020-08-05 | End: 2020-08-05

## 2020-08-05 RX ORDER — OXYCODONE AND ACETAMINOPHEN 5; 325 MG/1; MG/1
1 TABLET ORAL EVERY 4 HOURS PRN
Status: DISCONTINUED | OUTPATIENT
Start: 2020-08-05 | End: 2020-08-06 | Stop reason: HOSPADM

## 2020-08-05 RX ORDER — MUPIROCIN 20 MG/G
OINTMENT TOPICAL
Status: DISCONTINUED | OUTPATIENT
Start: 2020-08-05 | End: 2020-08-05 | Stop reason: HOSPADM

## 2020-08-05 RX ORDER — IBUPROFEN 800 MG/1
800 TABLET ORAL 3 TIMES DAILY
Qty: 30 TABLET | Refills: 1 | Status: SHIPPED | OUTPATIENT
Start: 2020-08-05 | End: 2021-07-23

## 2020-08-05 RX ORDER — PROPOFOL 10 MG/ML
VIAL (ML) INTRAVENOUS
Status: DISCONTINUED | OUTPATIENT
Start: 2020-08-05 | End: 2020-08-05

## 2020-08-05 RX ORDER — ONDANSETRON 2 MG/ML
INJECTION INTRAMUSCULAR; INTRAVENOUS
Status: DISCONTINUED | OUTPATIENT
Start: 2020-08-05 | End: 2020-08-05

## 2020-08-05 RX ORDER — CEFAZOLIN SODIUM 2 G/50ML
2 SOLUTION INTRAVENOUS
Status: DISCONTINUED | OUTPATIENT
Start: 2020-08-05 | End: 2020-08-05 | Stop reason: HOSPADM

## 2020-08-05 RX ORDER — ONDANSETRON 8 MG/1
8 TABLET, ORALLY DISINTEGRATING ORAL EVERY 8 HOURS PRN
Status: DISCONTINUED | OUTPATIENT
Start: 2020-08-05 | End: 2020-08-06 | Stop reason: HOSPADM

## 2020-08-05 RX ORDER — MIDAZOLAM HYDROCHLORIDE 1 MG/ML
INJECTION, SOLUTION INTRAMUSCULAR; INTRAVENOUS
Status: DISCONTINUED | OUTPATIENT
Start: 2020-08-05 | End: 2020-08-05

## 2020-08-05 RX ORDER — KETOROLAC TROMETHAMINE 30 MG/ML
30 INJECTION, SOLUTION INTRAMUSCULAR; INTRAVENOUS EVERY 6 HOURS
Status: DISCONTINUED | OUTPATIENT
Start: 2020-08-05 | End: 2020-08-06 | Stop reason: HOSPADM

## 2020-08-05 RX ORDER — HYDROCHLOROTHIAZIDE 12.5 MG/1
12.5 TABLET ORAL DAILY
Status: DISCONTINUED | OUTPATIENT
Start: 2020-08-05 | End: 2020-08-06 | Stop reason: HOSPADM

## 2020-08-05 RX ORDER — ROCURONIUM BROMIDE 10 MG/ML
INJECTION, SOLUTION INTRAVENOUS
Status: DISCONTINUED | OUTPATIENT
Start: 2020-08-05 | End: 2020-08-05

## 2020-08-05 RX ORDER — LIDOCAINE HYDROCHLORIDE 20 MG/ML
INJECTION INTRAVENOUS
Status: DISCONTINUED | OUTPATIENT
Start: 2020-08-05 | End: 2020-08-05

## 2020-08-05 RX ORDER — SODIUM CHLORIDE 0.9 % (FLUSH) 0.9 %
10 SYRINGE (ML) INJECTION
Status: DISCONTINUED | OUTPATIENT
Start: 2020-08-05 | End: 2020-08-05 | Stop reason: HOSPADM

## 2020-08-05 RX ORDER — ONDANSETRON 2 MG/ML
4 INJECTION INTRAMUSCULAR; INTRAVENOUS EVERY 8 HOURS PRN
Status: DISCONTINUED | OUTPATIENT
Start: 2020-08-05 | End: 2020-08-06 | Stop reason: HOSPADM

## 2020-08-05 RX ADMIN — SODIUM CHLORIDE, SODIUM LACTATE, POTASSIUM CHLORIDE, AND CALCIUM CHLORIDE: .6; .31; .03; .02 INJECTION, SOLUTION INTRAVENOUS at 07:08

## 2020-08-05 RX ADMIN — FENTANYL CITRATE 50 MCG: 50 INJECTION INTRAMUSCULAR; INTRAVENOUS at 07:08

## 2020-08-05 RX ADMIN — PANTOPRAZOLE SODIUM 40 MG: 40 INJECTION, POWDER, FOR SOLUTION INTRAVENOUS at 08:08

## 2020-08-05 RX ADMIN — MUPIROCIN: 20 OINTMENT TOPICAL at 06:08

## 2020-08-05 RX ADMIN — ROCURONIUM BROMIDE 50 MG: 10 INJECTION, SOLUTION INTRAVENOUS at 07:08

## 2020-08-05 RX ADMIN — HYDROMORPHONE HYDROCHLORIDE 0.2 MG: 2 INJECTION, SOLUTION INTRAMUSCULAR; INTRAVENOUS; SUBCUTANEOUS at 10:08

## 2020-08-05 RX ADMIN — PROPOFOL 150 MG: 10 INJECTION, EMULSION INTRAVENOUS at 07:08

## 2020-08-05 RX ADMIN — HYDROMORPHONE HYDROCHLORIDE 0.2 MG: 2 INJECTION, SOLUTION INTRAMUSCULAR; INTRAVENOUS; SUBCUTANEOUS at 09:08

## 2020-08-05 RX ADMIN — Medication 100 MG: at 07:08

## 2020-08-05 RX ADMIN — LABETALOL HYDROCHLORIDE 5 MG: 5 INJECTION, SOLUTION INTRAVENOUS at 07:08

## 2020-08-05 RX ADMIN — KETOROLAC TROMETHAMINE 30 MG: 30 INJECTION, SOLUTION INTRAMUSCULAR at 09:08

## 2020-08-05 RX ADMIN — ACETAMINOPHEN 1000 MG: 10 INJECTION, SOLUTION INTRAVENOUS at 09:08

## 2020-08-05 RX ADMIN — PROPOFOL 50 MG: 10 INJECTION, EMULSION INTRAVENOUS at 07:08

## 2020-08-05 RX ADMIN — Medication 30 MG: at 09:08

## 2020-08-05 RX ADMIN — CEFAZOLIN SODIUM 2 G: 2 SOLUTION INTRAVENOUS at 07:08

## 2020-08-05 RX ADMIN — ONDANSETRON 2 MG: 2 INJECTION, SOLUTION INTRAMUSCULAR; INTRAVENOUS at 07:08

## 2020-08-05 RX ADMIN — DEXAMETHASONE SODIUM PHOSPHATE 4 MG: 4 INJECTION, SOLUTION INTRAMUSCULAR; INTRAVENOUS at 07:08

## 2020-08-05 RX ADMIN — SODIUM CHLORIDE, SODIUM LACTATE, POTASSIUM CHLORIDE, AND CALCIUM CHLORIDE: .6; .31; .03; .02 INJECTION, SOLUTION INTRAVENOUS at 02:08

## 2020-08-05 RX ADMIN — SODIUM CHLORIDE, SODIUM LACTATE, POTASSIUM CHLORIDE, AND CALCIUM CHLORIDE: .6; .31; .03; .02 INJECTION, SOLUTION INTRAVENOUS at 08:08

## 2020-08-05 RX ADMIN — MIDAZOLAM HYDROCHLORIDE 2 MG: 1 INJECTION, SOLUTION INTRAMUSCULAR; INTRAVENOUS at 07:08

## 2020-08-05 RX ADMIN — IBUPROFEN 800 MG: 400 TABLET, FILM COATED ORAL at 04:08

## 2020-08-05 RX ADMIN — DEXTROSE MONOHYDRATE 12.5 MG: 50 INJECTION, SOLUTION INTRAVENOUS at 02:08

## 2020-08-05 RX ADMIN — ONDANSETRON 4 MG: 2 INJECTION INTRAMUSCULAR; INTRAVENOUS at 12:08

## 2020-08-05 RX ADMIN — HYDROCHLOROTHIAZIDE 12.5 MG: 12.5 TABLET ORAL at 07:08

## 2020-08-05 NOTE — TRANSFER OF CARE
"Anesthesia Transfer of Care Note    Patient: Akilah Parker    Procedure(s) Performed: Procedure(s) (LRB):  HYSTERECTOMY, TOTAL, LAPAROSCOPIC (N/A)  SALPINGECTOMY (Bilateral)    Patient location: PACU    Anesthesia Type: general    Transport from OR: Transported from OR on room air with adequate spontaneous ventilation    Post pain: adequate analgesia    Post assessment: no apparent anesthetic complications and tolerated procedure well    Post vital signs: stable    Level of consciousness: responds to stimulation    Nausea/Vomiting: no nausea/vomiting    Complications: none    Transfer of care protocol was followed      Last vitals:   Visit Vitals  /81 (BP Location: Right arm, Patient Position: Lying)   Pulse 76   Temp 36.5 °C (97.7 °F) (Tympanic)   Resp 14   Ht 5' 5" (1.651 m)   Wt 92.1 kg (203 lb)   LMP 07/25/2020 (Exact Date)   SpO2 98%   Breastfeeding No   BMI 33.78 kg/m²     "

## 2020-08-05 NOTE — ANESTHESIA PREPROCEDURE EVALUATION
08/05/2020    Pre-operative evaluation for Procedure(s) (LRB):  HYSTERECTOMY, TOTAL, LAPAROSCOPIC (N/A)    Akialh Parker is a 39 y.o. female     Patient Active Problem List   Diagnosis    Degeneration of lumbar or lumbosacral intervertebral disc    Lumbago    Sacroiliac joint pain    Greater trochanteric bursitis    Multiple thyroid nodules    Acne vulgaris    Obesity (BMI 30.0-34.9)    Other insomnia    CHARISSE (iron deficiency anemia)    GERD (gastroesophageal reflux disease)    Essential hypertension    Menorrhagia with regular cycle    Cyanocobalamin deficiency    Fibroids, intramural    Bilateral leg edema       Review of patient's allergies indicates:  No Known Allergies    No current facility-administered medications on file prior to encounter.      Current Outpatient Medications on File Prior to Encounter   Medication Sig Dispense Refill    ferrous sulfate 325 (65 FE) MG EC tablet Take 1 tablet (325 mg total) by mouth 2 (two) times daily. 180 tablet 3    cyanocobalamin (VITAMIN B-12) 100 MCG tablet Take 1 tablet (100 mcg total) by mouth once daily. 90 tablet 3       Past Surgical History:   Procedure Laterality Date    ORIF FOOT FRACTURE      right ankle    OTHER SURGICAL HISTORY      retrieved kidney stone without lithotripsy    TUBAL LIGATION       VITALS  Vitals:    08/05/20 0617   BP: (!) 131/90   Pulse: 69   Resp:    Temp:          Anesthesia Evaluation    I have reviewed the Patient Summary Reports.    I have reviewed the Nursing Notes.       Review of Systems  Anesthesia Hx:  No problems with previous Anesthesia  History of prior surgery of interest to airway management or planning: Denies Family Hx of Anesthesia complications.   Denies Personal Hx of Anesthesia complications.   Social:  Non-Smoker    Hematology/Oncology:  Hematology Normal   Oncology Normal     EENT/Dental:EENT/Dental Normal   Cardiovascular:   Hypertension    Pulmonary:  Pulmonary Normal    Renal/:   Chronic  Renal Disease renal calculi    Hepatic/GI:   GERD    Musculoskeletal:   Arthritis     Neurological:  Neurology Normal    Endocrine:  Endocrine Normal    Dermatological:  Skin Normal    Psych:  Psychiatric Normal           Physical Exam  General:  Well nourished    Airway/Jaw/Neck:  Airway Findings: Mouth Opening: Normal Tongue: Normal  General Airway Assessment: Adult  Mallampati: I  TM Distance: Normal, at least 6 cm  Jaw/Neck Findings:  Neck ROM: Normal ROM      Dental:  Dental Findings: In tact   Chest/Lungs:  Chest/Lungs Findings:    Heart/Vascular:  Heart Findings: Rate: Normal  Rhythm: Regular Rhythm        Mental Status:  Mental Status Findings:  Cooperative, Alert and Oriented         Anesthesia Plan  Type of Anesthesia, risks & benefits discussed:  Anesthesia Type:  general  Patient's Preference:   Intra-op Monitoring Plan: standard ASA monitors  Intra-op Monitoring Plan Comments:   Post Op Pain Control Plan:   Post Op Pain Control Plan Comments:   Induction:   IV  Beta Blocker:  Patient is not currently on a Beta-Blocker (No further documentation required).       Informed Consent: Patient understands risks and agrees with Anesthesia plan.  Questions answered.   ASA Score: 1     Day of Surgery Review of History & Physical: I have interviewed and examined the patient. I have reviewed the patient's H&P dated:  There are no significant changes.          Ready For Surgery From Anesthesia Perspective.

## 2020-08-05 NOTE — ANESTHESIA POSTPROCEDURE EVALUATION
Anesthesia Post Evaluation    Patient: Akilah Parker    Procedure(s) Performed: Procedure(s) (LRB):  HYSTERECTOMY, TOTAL, LAPAROSCOPIC (N/A)  SALPINGECTOMY (Bilateral)    Final Anesthesia Type: general    Patient location during evaluation: PACU  Patient participation: Yes- Able to Participate  Level of consciousness: awake and alert and oriented  Post-procedure vital signs: reviewed and stable  Pain management: adequate  Airway patency: patent    PONV status at discharge: No PONV  Anesthetic complications: no      Cardiovascular status: blood pressure returned to baseline, hemodynamically stable and stable  Respiratory status: unassisted, spontaneous ventilation and room air  Hydration status: euvolemic  Follow-up not needed.          Vitals Value Taken Time   /81 08/05/20 1105   Temp 36.4 °C (97.5 °F) 08/05/20 1105   Pulse 81 08/05/20 1105   Resp 20 08/05/20 1105   SpO2 100 % 08/05/20 1105         Event Time   Out of Recovery 11:00:12         Pain/Momo Score: Pain Rating Prior to Med Admin: 6 (8/5/2020 10:39 AM)  Pain Rating Post Med Admin: 9 (8/5/2020 10:00 AM)  Momo Score: 10 (8/5/2020 11:05 AM)

## 2020-08-05 NOTE — TELEPHONE ENCOUNTER
Advised to call doctor on cell by hieu     ----- Message from Bri Gore sent at 8/5/2020 10:29 AM CDT -----  Type: Patient Call Back    Who called: [facility] recovery room     What is the request in detail: pt is refusing Enon prescription. Pt stated she wants percocet prescription     Can the clinic reply by YEYOSNER? Call back     Would the patient rather a call back or a response via [facility]? Call back     Best call back number: [phone]

## 2020-08-05 NOTE — PLAN OF CARE
Pt with complaints of pain but refusing po pain meds due to nausea. Meds for nausea given and in progress.  Dr. Oliva updated via secure chat.

## 2020-08-05 NOTE — OR NURSING
C/O dizziness  B/P 122 75  P 74.  1345  129 74. Encouraged to drink fluids. Fresh fruit ordered encouraged to eat. 1350 stated dizziness is getting better. Abdomen soft vaginal bleeding scant amount dark red.

## 2020-08-05 NOTE — INTERVAL H&P NOTE
The patient has been examined and the H&P has been reviewed:    I concur with the findings and no changes have occurred since H&P was written.    Surgery risks, benefits and alternative options discussed and understood by patient/family.          Active Hospital Problems    Diagnosis  POA    Fibroids, intramural [D25.1]  Yes      Resolved Hospital Problems   No resolved problems to display.

## 2020-08-05 NOTE — PROGRESS NOTES
Received patient into room 243 via stretcher from [facility]. Oriented to room. Patient up to bathroom to void.  No c/o dizziness. Voided without difficulty.  Returned to bed lying on side c/o nausea. Call light near. Clear liquids given. Family at side.  Dr. Luque notified of blood pressure.  Med ordered.

## 2020-08-05 NOTE — PLAN OF CARE
Momo 9/10. VSS per flow sheet. Oriented x 4. Pain level 5/10 per patient.  N/v not present in Recovery. Deal catheter intact per MD order draining clear yellow urine.  3 abdominal lap sites with island dressing cdi. Shell pad in place cdi.  Message left with Dr Oliva office and cell phone and secure chat concerning Norco prescription. Patient states that she does not take Norco, and would like Percocet to be prescribed. At this time, Dr Oliva has not responded.  See chart for full assessment. Hand off report to JOHN Monzon SDS.

## 2020-08-05 NOTE — PLAN OF CARE
Pt ambulated to the bathroom,voided without difficulty. Upon return to [facility] complains of increased dizziness again.  Denies ability to go home.  Dr. Oliva contacted and order obtained for extended recovery.

## 2020-08-05 NOTE — ANESTHESIA PROCEDURE NOTES
Intubation  Performed by: Urmila SHAFFER Do, CRNA  Authorized by: Alysia Lucas MD     Intubation:     Induction:  Intravenous    Intubated:  Postinduction    Mask Ventilation:  Easy with oral airway    Attempts:  1    Attempted By:  CRNA    Method of Intubation:  Direct    Blade:  Osborne 2    Laryngeal View Grade: Grade I - full view of chords      Difficult Airway Encountered?: No      Complications:  None    Airway Device:  Oral endotracheal tube    Airway Device Size:  7.5    Style/Cuff Inflation:  Cuffed    Inflation Amount (mL):  6    Tube secured:  21    Secured at:  The lips    Placement Verified By:  Capnometry    Complicating Factors:  None    Findings Post-Intubation:  BS equal bilateral

## 2020-08-06 VITALS
DIASTOLIC BLOOD PRESSURE: 87 MMHG | OXYGEN SATURATION: 100 % | HEART RATE: 67 BPM | HEIGHT: 65 IN | BODY MASS INDEX: 33.82 KG/M2 | WEIGHT: 203 LBS | SYSTOLIC BLOOD PRESSURE: 132 MMHG | TEMPERATURE: 98 F | RESPIRATION RATE: 18 BRPM

## 2020-08-06 PROCEDURE — 63600175 PHARM REV CODE 636 W HCPCS: Performed by: OBSTETRICS & GYNECOLOGY

## 2020-08-06 PROCEDURE — 25000003 PHARM REV CODE 250: Performed by: OBSTETRICS & GYNECOLOGY

## 2020-08-06 RX ORDER — OXYCODONE AND ACETAMINOPHEN 5; 325 MG/1; MG/1
1 TABLET ORAL EVERY 8 HOURS PRN
Qty: 20 TABLET | Refills: 0 | Status: SHIPPED | OUTPATIENT
Start: 2020-08-06 | End: 2021-07-23

## 2020-08-06 RX ADMIN — HYDROCHLOROTHIAZIDE 12.5 MG: 12.5 TABLET ORAL at 08:08

## 2020-08-06 RX ADMIN — KETOROLAC TROMETHAMINE 30 MG: 30 INJECTION, SOLUTION INTRAMUSCULAR at 05:08

## 2020-08-06 RX ADMIN — IBUPROFEN 800 MG: 400 TABLET, FILM COATED ORAL at 08:08

## 2020-08-06 RX ADMIN — SODIUM CHLORIDE, SODIUM LACTATE, POTASSIUM CHLORIDE, AND CALCIUM CHLORIDE: .6; .31; .03; .02 INJECTION, SOLUTION INTRAVENOUS at 01:08

## 2020-08-06 NOTE — PLAN OF CARE
Problem: Adult Inpatient Plan of Care  Goal: Plan of Care Review  Outcome: Met  Goal: Patient-Specific Goal (Individualization)  Outcome: Met  Goal: Absence of Hospital-Acquired Illness or Injury  Outcome: Met  Goal: Optimal Comfort and Wellbeing  Outcome: Met  Goal: Readiness for Transition of Care  Outcome: Met  Goal: Rounds/Family Conference  Outcome: Met     Problem: Infection  Goal: Infection Symptom Resolution  Outcome: Met     Problem: Bleeding (Hysterectomy)  Goal: Absence of Bleeding  Outcome: Met     Problem: Bowel Elimination Impaired (Hysterectomy)  Goal: Effective Bowel Elimination  Outcome: Met     Problem: Infection (Hysterectomy)  Goal: Absence of Infection Signs/Symptoms  Outcome: Met     Problem: Ongoing Anesthesia Effects (Hysterectomy)  Goal: Anesthesia/Sedation Recovery  Outcome: Met     Problem: Pain (Hysterectomy)  Goal: Acceptable Pain Control  Outcome: Met     Problem: Postoperative Nausea and Vomiting (Hysterectomy)  Goal: Nausea and Vomiting Relief  Outcome: Met     Problem: Postoperative Urinary Retention (Hysterectomy)  Goal: Effective Urinary Elimination  Outcome: Met    VSS. Afebrile  Ambulating in room without difficulty. Showered.  Vaginal bleeding - spotting. No clots. No odor.  3 island dressings that are clean, dry and intact. No drainage noted.   Tolerating regular diet without any GI symptoms voiced.  Voiding without difficulty. - flatus. - BM.  Pain controlled with PRN meds.  Plan of care reviewed with patient. All questions answered.    PT in agreement with discharge.

## 2020-08-06 NOTE — DISCHARGE INSTRUCTIONS
General Discharge Instructions:    Keep incision clean with warm soapy water when you shower. Pat Dry.  Do not apply ointment or bandage unless instructed to do so.  You may or may not have steri strips (pieces of tape) that are across your incision.  If you do, they will fall off on their own.   If your incision comes open, starts draining nasty, foul smelling discharge, or it is hurting more than it has been, then CALL YOUR DOCTOR IMMEDIATELY!!!!!    May follow a regular diet, unless otherwise discussed with physician.  Drink plenty fluids.  Activity as tolerated.  No lifting anything greater than 10 pounds. No heavy exercise.  No sex, douching or tampons for 6 weeks.  May return to work/school as discussed with physician.  Follow MD specific instructions, follow up as instructed.    Report to MD:    Pain that is worsening   Nausea and vomiting  Temp >100.4  Heavy vaginal bleeding  Incision comes open or starts draining nasty, foul smelling discharge

## 2020-08-06 NOTE — PLAN OF CARE
VSS.  BP improved after dose of HCTZ ( home med). Voiding clear yellow urine. No further emesis since 2130. Island dressings intact x 3 to abdomen. Dry and intact. Light amount of vaginal bleeding. Toradol effective for discomfort. Plans for discharge today

## 2020-08-06 NOTE — DISCHARGE SUMMARY
[facility]  Obstetrics & Gynecology  Discharge Summary    Patient Name: Akilah Parker  MRN: [id number]  Admission Date: 8/5/2020  Hospital Length of Stay: 0 days  Discharge Date and Time:  08/06/2020 7:21 AM  Attending Physician: Claudio Oliva MD   Discharging Provider: Claudio Oliva MD  Primary Care Provider: Argelia Linares MD    HPI:  No notes on file    Hospital Course:  No notes on file    Procedure(s) (LRB):  HYSTERECTOMY, TOTAL, LAPAROSCOPIC (N/A)  SALPINGECTOMY (Bilateral)         Significant Diagnostic Studies: Labs: CBC No results for input(s): WBC, HGB, HCT, PLT in the last 48 hours.      Pending Diagnostic Studies:     Procedure Component Value Units Date/Time    Specimen to Pathology, Surgery Gynecology and Obstetrics [[id number]] Collected: 08/05/20 0908    Order Status: Sent Lab Status: In process Updated: 08/05/20 0909        Final Active Diagnoses:    Diagnosis Date Noted POA    PRINCIPAL PROBLEM:  S/P total laparoscopic hysterectomy 8/2020 [Z90.710] 08/05/2020 No    Pre-op testing [Z01.818] 08/05/2020 Not Applicable    Fibroids, intramural [D25.1] 05/27/2020 Yes    Menorrhagia with regular cycle [N92.0] 05/12/2020 Yes      Problems Resolved During this Admission:        Discharged Condition: good    Disposition: Home or Self Care    Follow Up:  Follow-up Information     Claudio Oliva MD In 3 weeks.    Specialties: Obstetrics, Obstetrics and Gynecology  Why: For wound re-check  Contact information:  [address]  [address] [place] [id number] [phone]                 Patient Instructions:      Diet general     Pelvic Rest     Lifting restrictions     Call MD for:  temperature >100.4     Call MD for:  persistent nausea and vomiting     Call MD for:  severe uncontrolled pain     Call MD for:  difficulty breathing, headache or visual disturbances     Call MD for:  redness, tenderness, or signs of infection (pain, swelling, redness, odor or green/yellow discharge around  incision site)     Call MD for:  persistent dizziness or light-headedness     Call MD for:  extreme fatigue     No dressing needed     Weight bearing restrictions (specify)     Medications:  Reconciled Home Medications:      Medication List      START taking these medications    HYDROcodone-acetaminophen 5-325 mg per tablet  Commonly known as: NORCO  Take 1 tablet by mouth every 6 (six) hours as needed for Pain.     ibuprofen 800 MG tablet  Commonly known as: ADVIL,MOTRIN  Take 1 tablet (800 mg total) by mouth 3 (three) times daily.     oxyCODONE-acetaminophen 5-325 mg per tablet  Commonly known as: PERCOCET  Take 1 tablet by mouth every 8 (eight) hours as needed for Pain.        CONTINUE taking these medications    cyanocobalamin 100 MCG tablet  Commonly known as: VITAMIN B-12  Take 1 tablet (100 mcg total) by mouth once daily.     ferrous sulfate 325 (65 FE) MG EC tablet  Take 1 tablet (325 mg total) by mouth 2 (two) times daily.     hydroCHLOROthiazide 12.5 MG Tab  Commonly known as: HYDRODIURIL  Take 1 tablet (12.5 mg total) by mouth once daily.        STOP taking these medications    naproxen sodium 220 MG tablet  Commonly known as: ANAPROX          Hydrocodone cancelled, use percocet in its place.    Claudio Oliva MD  Obstetrics & Gynecology  [facility]

## 2020-08-06 NOTE — NURSING
Discharge instructions given. RN reviewed signs/symptoms to call MD for, signs/symptoms to call 911 for. RN also reviewed COVID prevention and follow up appt. Teach back performed. All questions answered. She verbalized understanding. Patient in agreement with discharge.

## 2020-08-07 LAB
FINAL PATHOLOGIC DIAGNOSIS: NORMAL
GROSS: NORMAL

## 2020-08-07 NOTE — OP NOTE
Dictation #2  MRN:[id number]  CSN:[id number]      Date of surgery August 5, 2020    Preoperative diagnosis: fibroids and menorrhagia    Postoperative diagnosis:  Same    Surgeon:  Claudio Oliva MD    Assistant surgeon:  Humberto Esteves MD    Surgery::  Laparoscopic hysterectomy of uterus greater than 250 mg with bilateral salpingectomy    Anesthesia:  General endotracheal    Pathology:  Uterus cervix bilateral fallopian tubes    Complications: none noted    EBL:  Approximately 150 mL    Procedure in detail  After informed consent was obtained, the patient was taken to the operating room and administered general endotracheal anesthesia without difficulty.  Patient was prepped and draped in normal sterile fashion in the dorsal lithotomy position.  Deal catheter was placed.  Heavy weighted speculum was then placed into the posterior fornix of the vagina, in the anterior lip of the cervix was grasped with a single-tooth tenaculum.  The uterus was sounded to 10 cm.  Stay sutures of 0 Vicryl were placed at 12 o'clock. and 6:00 o'clock..  The cervix was progressively dilated using Marcel Meza dilators to accommodate the KOH colpotomizer.  Attention was then turned to the abdomen in which infraumbilical skin incision was made with a scalpel approximately 10 mm in diameter.  The Veress needle was inserted into the same incision, and the abdomen insufflated, 1st with low-flow, then with high-flow CO2 gas.  Upon adequate insufflation the Veress needle was removed and a 10 mm optical trocar placed through the same incision using a laparoscopic for assistance.  There were no significant adhesions noted.  A 5 mm incision was made near the left anterior superior iliac crest and a 5 mm trocar placed under direct visualization.  This was repeated on the right side.  The fallopian tube, round ligament, in utero-ovarian ligament on the left side were clamped cauterized and transected with the EnSeal device.  The cardinal ligament and branches  of the uterine artery were clamped cauterized and transected in a stepwise fashion using the EnSeal as well.  The bladder was dissected off the lower uterine segment and cervix.  The fallopian tube utero-ovarian ligament and round ligament on the right side were clamped cauterized and transected with the EnSeal in a similar fashion to the left, as were the uterine artery and cardinal ligament.  Cauterization to place all the way to the level uterosacral ligaments on both sides.  The uterus and cervix were amputated from the vagina using the Harmonic scalpel.  The uterus was too bulky to removed vaginally.  Morcellation was required at the vaginal introitus.  Once the uterus and cervix were successfully morcellated from the vagina the specimen was weighed; the specimen weighed approximately 368 g.  Attention was then turned back to the abdomen and the vaginal cuff was sewn laparoscopically using the Endo Stitch with a 2 0 V lock Vicryl suture in a running fashion.  Each fallopian tube was then dissected off the respective ovaries using the EnSeal.  The fallopian tubes were removed laparoscopically through the 10 mm trocar.  The pelvis was copiously irrigated with saline.  Excellent hemostasis was observed.  The 5 mm trocar sites were closed with a single simple interrupted 4-0 Rapide Vicryl suture.  The 10 mm trocar site was closed with 3 simple interrupted 4-0 Rapide Vicryl sutures. Sponge, lap, needle count, were correct x2.  There were no noted intraoperative complications.  The patient was taken recovery awake in stable condition.

## 2020-08-17 ENCOUNTER — TELEPHONE (OUTPATIENT)
Dept: OBSTETRICS AND GYNECOLOGY | Facility: CLINIC | Age: 40
End: 2020-08-17

## 2020-08-17 NOTE — TELEPHONE ENCOUNTER
----- Message from Shelby George LPN sent at 8/6/2020 10:36 AM CDT -----  Can u call dr oliva and ask or who should I ask? Please advise   ----- Message -----  From: Kimberly Menjivar  Sent: 8/6/2020  10:27 AM CDT  To: [facility] Staff    Type: Patient Call Back    Who called:Azalia    What is the request in detail:percocet and hydrocodone was sent to pharmacy. [facility] wants to know which one should they fill    Can the clinic reply by [facility]?no    Would the patient rather a call back or a response via [facility]? call    Best call back number:        Patient is aware that she will see Dr Oliva tomorrow for incision check

## 2020-08-18 ENCOUNTER — POSTPARTUM VISIT (OUTPATIENT)
Dept: OBSTETRICS AND GYNECOLOGY | Facility: CLINIC | Age: 40
End: 2020-08-18
Payer: COMMERCIAL

## 2020-08-18 VITALS — BODY MASS INDEX: 33.66 KG/M2 | HEIGHT: 65 IN | WEIGHT: 202 LBS

## 2020-08-18 DIAGNOSIS — Z09 POSTOPERATIVE EXAMINATION: Primary | ICD-10-CM

## 2020-08-18 DIAGNOSIS — Z90.710 S/P TOTAL HYSTERECTOMY: ICD-10-CM

## 2020-08-18 PROCEDURE — 99999 PR PBB SHADOW E&M-EST. PATIENT-LVL II: ICD-10-PCS | Mod: PBBFAC,,, | Performed by: OBSTETRICS & GYNECOLOGY

## 2020-08-18 PROCEDURE — 99024 PR POST-OP FOLLOW-UP VISIT: ICD-10-PCS | Mod: S$GLB,,, | Performed by: OBSTETRICS & GYNECOLOGY

## 2020-08-18 PROCEDURE — 99999 PR PBB SHADOW E&M-EST. PATIENT-LVL II: CPT | Mod: PBBFAC,,, | Performed by: OBSTETRICS & GYNECOLOGY

## 2020-08-18 PROCEDURE — 99024 POSTOP FOLLOW-UP VISIT: CPT | Mod: S$GLB,,, | Performed by: OBSTETRICS & GYNECOLOGY

## 2020-08-18 NOTE — PROGRESS NOTES
"Cc:  Wound check    HPI:  Pt had uncomplicated TLH on 8/5/20 of 365 gm uterus.  Pt called for early postop appt because she noticed some drainage from umbilical port site.  Pt feeling well, denies any significant pain.      Pathology:    Uterus and cervix (356 g):  -Multiple leiomyomata (7.8 to 3.2 cm in diameter each)  -Early proliferative endometrium. Glands are synchronous and free of hyperplasia and atypia.  -Squamous metaplasia, prominent chronic inflammation, and very early Nabothian cyst formation of the  cervix  Fallopian tubes and fimbria (2):  -No intrinsic histopathologic abnormalities of either tube    Container Label: Surgery MRN and Pathology MRN: [id number]  Received in formalin labeled uterus, cervix, bilateral fallopian tubes" and consist of 7 uterine tissue  fragments measuring in aggregate 13.6 x 10.2 x 7.2 cm. The total weight of specimen is 356 g. Two  detached fallopian tubes measure 4.2 cm long x 0.6 cm diameter and 2.8 cm long x 0.6 cm diameter,  respectively.  Serial sectioning demonstrates 0.2 cm thick endometrium. Multiple myomas are identified ranging from 7.8  cm to 3.2 cm in maximum diameter. The largest myoma shows small focal area of myxoid feature. The  bilateral fallopian tubes are grossly unremarkable. Representative sections are submitted in 6 cassettes.  Summary of sections:  1U-0L-xcimbq  1C-endometrium  1D-representative sections of myomas  3S-2H-upzluqfhw fallopian tubes    Vitals:    08/18/20 1106   Weight: 91.6 kg (202 lb)   Height: 5' 5" (1.651 m)     Abd:  Trocar sites healing well, no evidence of infection.  Sutures still in place    Assessment/Plan  1. Postoperative examination     2. S/P total laparoscopic hysterectomy 8/2020       Pt reassured no evidence of infection, discussed cleaning more abrasively that what she has been doing to make sutures dissolve  F/u 4 wks for pelvic exam  "

## 2020-09-29 ENCOUNTER — PATIENT MESSAGE (OUTPATIENT)
Dept: OTHER | Facility: OTHER | Age: 40
End: 2020-09-29

## 2020-11-04 ENCOUNTER — PATIENT MESSAGE (OUTPATIENT)
Dept: OBSTETRICS AND GYNECOLOGY | Facility: CLINIC | Age: 40
End: 2020-11-04

## 2020-11-10 ENCOUNTER — OFFICE VISIT (OUTPATIENT)
Dept: OBSTETRICS AND GYNECOLOGY | Facility: CLINIC | Age: 40
End: 2020-11-10
Payer: COMMERCIAL

## 2020-11-10 VITALS — BODY MASS INDEX: 34.49 KG/M2 | HEIGHT: 65 IN | WEIGHT: 207 LBS

## 2020-11-10 DIAGNOSIS — Z90.710 S/P TOTAL HYSTERECTOMY: ICD-10-CM

## 2020-11-10 DIAGNOSIS — Z09 POSTOPERATIVE EXAMINATION: Primary | ICD-10-CM

## 2020-11-10 PROCEDURE — 99024 PR POST-OP FOLLOW-UP VISIT: ICD-10-PCS | Mod: S$GLB,,, | Performed by: OBSTETRICS & GYNECOLOGY

## 2020-11-10 PROCEDURE — 99999 PR PBB SHADOW E&M-EST. PATIENT-LVL II: CPT | Mod: PBBFAC,,, | Performed by: OBSTETRICS & GYNECOLOGY

## 2020-11-10 PROCEDURE — 99024 POSTOP FOLLOW-UP VISIT: CPT | Mod: S$GLB,,, | Performed by: OBSTETRICS & GYNECOLOGY

## 2020-11-10 PROCEDURE — 99999 PR PBB SHADOW E&M-EST. PATIENT-LVL II: ICD-10-PCS | Mod: PBBFAC,,, | Performed by: OBSTETRICS & GYNECOLOGY

## 2020-11-10 NOTE — PROGRESS NOTES
Cc:  Postop TLH    HPI:  Pt had uncomplicated TLH on 8/5/20.  Pt is doing well. Pt has not been sexually active yet.  Pt denies any bowel or bladder probs.  Pt is happy having had hysterectomy.    Pathology:  Uterus and cervix (356 g):   -Multiple leiomyomata (7.8 to 3.2 cm in diameter each)   -Early proliferative endometrium.  Glands are synchronous and free of   hyperplasia and atypia.   -Squamous metaplasia, prominent chronic inflammation, and very early   Nabothian cyst formation of the cervix   Fallopian tubes and fimbria (2):   -No intrinsic histopathologic abnormalities of either tube      Abd:  Soft NTND  Pelvic:  Vag cuff well healed, normal pink mucosa, no pain w/ bimanual exam    Assessment/Plan  Postop TLH -  Pt may return to full activity, f/u 1 yr for annual gyn exam

## 2020-12-11 ENCOUNTER — PATIENT MESSAGE (OUTPATIENT)
Dept: OTHER | Facility: OTHER | Age: 40
End: 2020-12-11

## 2021-01-02 ENCOUNTER — IMMUNIZATION (OUTPATIENT)
Dept: INTERNAL MEDICINE | Facility: CLINIC | Age: 41
End: 2021-01-02
Payer: COMMERCIAL

## 2021-01-02 DIAGNOSIS — Z23 NEED FOR VACCINATION: ICD-10-CM

## 2021-01-02 PROCEDURE — 91300 COVID-19, MRNA, LNP-S, PF, 30 MCG/0.3 ML DOSE VACCINE: CPT | Mod: PBBFAC | Performed by: INTERNAL MEDICINE

## 2021-01-23 ENCOUNTER — IMMUNIZATION (OUTPATIENT)
Dept: INTERNAL MEDICINE | Facility: CLINIC | Age: 41
End: 2021-01-23
Payer: COMMERCIAL

## 2021-01-23 DIAGNOSIS — Z23 NEED FOR VACCINATION: Primary | ICD-10-CM

## 2021-01-23 PROCEDURE — 91300 COVID-19, MRNA, LNP-S, PF, 30 MCG/0.3 ML DOSE VACCINE: CPT | Mod: PBBFAC | Performed by: INTERNAL MEDICINE

## 2021-01-23 PROCEDURE — 0002A COVID-19, MRNA, LNP-S, PF, 30 MCG/0.3 ML DOSE VACCINE: CPT | Mod: PBBFAC | Performed by: INTERNAL MEDICINE

## 2021-03-18 ENCOUNTER — PATIENT MESSAGE (OUTPATIENT)
Dept: RESEARCH | Facility: HOSPITAL | Age: 41
End: 2021-03-18

## 2021-03-26 ENCOUNTER — PATIENT MESSAGE (OUTPATIENT)
Dept: RESEARCH | Facility: HOSPITAL | Age: 41
End: 2021-03-26

## 2021-07-07 ENCOUNTER — PATIENT MESSAGE (OUTPATIENT)
Dept: ADMINISTRATIVE | Facility: HOSPITAL | Age: 41
End: 2021-07-07

## 2021-07-23 ENCOUNTER — OFFICE VISIT (OUTPATIENT)
Dept: PRIMARY CARE CLINIC | Facility: CLINIC | Age: 41
End: 2021-07-23
Payer: COMMERCIAL

## 2021-07-23 DIAGNOSIS — Z00.00 ROUTINE MEDICAL EXAM: Primary | ICD-10-CM

## 2021-07-23 DIAGNOSIS — R60.0 BILATERAL LEG EDEMA: ICD-10-CM

## 2021-07-23 DIAGNOSIS — I10 ESSENTIAL HYPERTENSION: ICD-10-CM

## 2021-07-23 PROCEDURE — 99395 PREV VISIT EST AGE 18-39: CPT | Mod: S$GLB,,, | Performed by: FAMILY MEDICINE

## 2021-07-23 PROCEDURE — 1160F RVW MEDS BY RX/DR IN RCRD: CPT | Mod: CPTII,S$GLB,, | Performed by: FAMILY MEDICINE

## 2021-07-23 PROCEDURE — 99395 PR PREVENTIVE VISIT,EST,18-39: ICD-10-PCS | Mod: S$GLB,,, | Performed by: FAMILY MEDICINE

## 2021-07-23 PROCEDURE — 1126F PR PAIN SEVERITY QUANTIFIED, NO PAIN PRESENT: ICD-10-PCS | Mod: CPTII,S$GLB,, | Performed by: FAMILY MEDICINE

## 2021-07-23 PROCEDURE — 3008F PR BODY MASS INDEX (BMI) DOCUMENTED: ICD-10-PCS | Mod: CPTII,S$GLB,, | Performed by: FAMILY MEDICINE

## 2021-07-23 PROCEDURE — 3008F BODY MASS INDEX DOCD: CPT | Mod: CPTII,S$GLB,, | Performed by: FAMILY MEDICINE

## 2021-07-23 PROCEDURE — 99999 PR PBB SHADOW E&M-EST. PATIENT-LVL IV: ICD-10-PCS | Mod: PBBFAC,,, | Performed by: FAMILY MEDICINE

## 2021-07-23 PROCEDURE — 1159F MED LIST DOCD IN RCRD: CPT | Mod: CPTII,S$GLB,, | Performed by: FAMILY MEDICINE

## 2021-07-23 PROCEDURE — 3079F DIAST BP 80-89 MM HG: CPT | Mod: CPTII,S$GLB,, | Performed by: FAMILY MEDICINE

## 2021-07-23 PROCEDURE — 1159F PR MEDICATION LIST DOCUMENTED IN MEDICAL RECORD: ICD-10-PCS | Mod: CPTII,S$GLB,, | Performed by: FAMILY MEDICINE

## 2021-07-23 PROCEDURE — 3074F SYST BP LT 130 MM HG: CPT | Mod: CPTII,S$GLB,, | Performed by: FAMILY MEDICINE

## 2021-07-23 PROCEDURE — 99999 PR PBB SHADOW E&M-EST. PATIENT-LVL IV: CPT | Mod: PBBFAC,,, | Performed by: FAMILY MEDICINE

## 2021-07-23 PROCEDURE — 1160F PR REVIEW ALL MEDS BY PRESCRIBER/CLIN PHARMACIST DOCUMENTED: ICD-10-PCS | Mod: CPTII,S$GLB,, | Performed by: FAMILY MEDICINE

## 2021-07-23 PROCEDURE — 3074F PR MOST RECENT SYSTOLIC BLOOD PRESSURE < 130 MM HG: ICD-10-PCS | Mod: CPTII,S$GLB,, | Performed by: FAMILY MEDICINE

## 2021-07-23 PROCEDURE — 3079F PR MOST RECENT DIASTOLIC BLOOD PRESSURE 80-89 MM HG: ICD-10-PCS | Mod: CPTII,S$GLB,, | Performed by: FAMILY MEDICINE

## 2021-07-23 PROCEDURE — 1126F AMNT PAIN NOTED NONE PRSNT: CPT | Mod: CPTII,S$GLB,, | Performed by: FAMILY MEDICINE

## 2021-07-23 RX ORDER — HYDROCHLOROTHIAZIDE 12.5 MG/1
12.5 TABLET ORAL DAILY
Qty: 30 TABLET | Refills: 0 | Status: SHIPPED | OUTPATIENT
Start: 2021-07-23 | End: 2021-12-02 | Stop reason: ALTCHOICE

## 2021-07-26 ENCOUNTER — LAB VISIT (OUTPATIENT)
Dept: LAB | Facility: HOSPITAL | Age: 41
End: 2021-07-26
Attending: FAMILY MEDICINE
Payer: COMMERCIAL

## 2021-07-26 VITALS
WEIGHT: 219.56 LBS | HEIGHT: 65 IN | SYSTOLIC BLOOD PRESSURE: 124 MMHG | TEMPERATURE: 99 F | HEART RATE: 82 BPM | BODY MASS INDEX: 36.58 KG/M2 | OXYGEN SATURATION: 98 % | DIASTOLIC BLOOD PRESSURE: 84 MMHG

## 2021-07-26 DIAGNOSIS — Z00.00 ROUTINE MEDICAL EXAM: ICD-10-CM

## 2021-07-26 PROBLEM — Z01.818 PRE-OP TESTING: Status: RESOLVED | Noted: 2020-08-05 | Resolved: 2021-07-26

## 2021-07-26 PROBLEM — E66.811 OBESITY (BMI 30.0-34.9): Status: RESOLVED | Noted: 2019-01-16 | Resolved: 2021-07-26

## 2021-07-26 PROBLEM — E66.9 OBESITY (BMI 30.0-34.9): Status: RESOLVED | Noted: 2019-01-16 | Resolved: 2021-07-26

## 2021-07-26 LAB
25(OH)D3+25(OH)D2 SERPL-MCNC: 18 NG/ML (ref 30–96)
ALBUMIN SERPL BCP-MCNC: 3.4 G/DL (ref 3.5–5.2)
ALP SERPL-CCNC: 71 U/L (ref 55–135)
ALT SERPL W/O P-5'-P-CCNC: 10 U/L (ref 10–44)
ANION GAP SERPL CALC-SCNC: 12 MMOL/L (ref 8–16)
AST SERPL-CCNC: 13 U/L (ref 10–40)
BILIRUB SERPL-MCNC: 0.4 MG/DL (ref 0.1–1)
BUN SERPL-MCNC: 14 MG/DL (ref 6–20)
CALCIUM SERPL-MCNC: 10 MG/DL (ref 8.7–10.5)
CHLORIDE SERPL-SCNC: 106 MMOL/L (ref 95–110)
CHOLEST SERPL-MCNC: 210 MG/DL (ref 120–199)
CHOLEST/HDLC SERPL: 3.9 {RATIO} (ref 2–5)
CO2 SERPL-SCNC: 21 MMOL/L (ref 23–29)
CREAT SERPL-MCNC: 0.9 MG/DL (ref 0.5–1.4)
ERYTHROCYTE [DISTWIDTH] IN BLOOD BY AUTOMATED COUNT: 14.2 % (ref 11.5–14.5)
EST. GFR  (AFRICAN AMERICAN): >60 ML/MIN/1.73 M^2
EST. GFR  (NON AFRICAN AMERICAN): >60 ML/MIN/1.73 M^2
ESTIMATED AVG GLUCOSE: 103 MG/DL (ref 68–131)
GLUCOSE SERPL-MCNC: 86 MG/DL (ref 70–110)
HBA1C MFR BLD: 5.2 % (ref 4–5.6)
HCT VFR BLD AUTO: 39.3 % (ref 37–48.5)
HDLC SERPL-MCNC: 54 MG/DL (ref 40–75)
HDLC SERPL: 25.7 % (ref 20–50)
HGB BLD-MCNC: 12.9 G/DL (ref 12–16)
LDLC SERPL CALC-MCNC: 142.6 MG/DL (ref 63–159)
MCH RBC QN AUTO: 30.4 PG (ref 27–31)
MCHC RBC AUTO-ENTMCNC: 32.8 G/DL (ref 32–36)
MCV RBC AUTO: 93 FL (ref 82–98)
NONHDLC SERPL-MCNC: 156 MG/DL
PLATELET # BLD AUTO: 268 K/UL (ref 150–450)
PMV BLD AUTO: 12.4 FL (ref 9.2–12.9)
POTASSIUM SERPL-SCNC: 4 MMOL/L (ref 3.5–5.1)
PROT SERPL-MCNC: 7.4 G/DL (ref 6–8.4)
RBC # BLD AUTO: 4.25 M/UL (ref 4–5.4)
SODIUM SERPL-SCNC: 139 MMOL/L (ref 136–145)
T4 FREE SERPL-MCNC: 1.03 NG/DL (ref 0.71–1.51)
TRIGL SERPL-MCNC: 67 MG/DL (ref 30–150)
TSH SERPL DL<=0.005 MIU/L-ACNC: 1.07 UIU/ML (ref 0.4–4)
WBC # BLD AUTO: 4.32 K/UL (ref 3.9–12.7)

## 2021-07-26 PROCEDURE — 82306 VITAMIN D 25 HYDROXY: CPT | Performed by: FAMILY MEDICINE

## 2021-07-26 PROCEDURE — 86803 HEPATITIS C AB TEST: CPT | Performed by: FAMILY MEDICINE

## 2021-07-26 PROCEDURE — 83036 HEMOGLOBIN GLYCOSYLATED A1C: CPT | Performed by: FAMILY MEDICINE

## 2021-07-26 PROCEDURE — 80053 COMPREHEN METABOLIC PANEL: CPT | Performed by: FAMILY MEDICINE

## 2021-07-26 PROCEDURE — 36415 COLL VENOUS BLD VENIPUNCTURE: CPT | Mod: PN | Performed by: FAMILY MEDICINE

## 2021-07-26 PROCEDURE — 85027 COMPLETE CBC AUTOMATED: CPT | Performed by: FAMILY MEDICINE

## 2021-07-26 PROCEDURE — 84443 ASSAY THYROID STIM HORMONE: CPT | Performed by: FAMILY MEDICINE

## 2021-07-26 PROCEDURE — 84439 ASSAY OF FREE THYROXINE: CPT | Performed by: FAMILY MEDICINE

## 2021-07-26 PROCEDURE — 80061 LIPID PANEL: CPT | Performed by: FAMILY MEDICINE

## 2021-07-27 ENCOUNTER — PATIENT MESSAGE (OUTPATIENT)
Dept: PRIMARY CARE CLINIC | Facility: CLINIC | Age: 41
End: 2021-07-27

## 2021-07-27 LAB — HCV AB SERPL QL IA: NEGATIVE

## 2021-08-05 ENCOUNTER — PATIENT OUTREACH (OUTPATIENT)
Dept: ADMINISTRATIVE | Facility: OTHER | Age: 41
End: 2021-08-05

## 2021-08-06 ENCOUNTER — TELEPHONE (OUTPATIENT)
Dept: OPTOMETRY | Facility: CLINIC | Age: 41
End: 2021-08-06

## 2021-08-15 DIAGNOSIS — R60.0 BILATERAL LEG EDEMA: ICD-10-CM

## 2021-08-15 DIAGNOSIS — I10 ESSENTIAL HYPERTENSION: ICD-10-CM

## 2021-08-16 RX ORDER — HYDROCHLOROTHIAZIDE 12.5 MG/1
TABLET ORAL
Qty: 30 TABLET | Refills: 0 | OUTPATIENT
Start: 2021-08-16

## 2021-08-16 RX ORDER — HYDROCHLOROTHIAZIDE 12.5 MG/1
12.5 TABLET ORAL DAILY
Qty: 30 TABLET | Refills: 0 | Status: SHIPPED | OUTPATIENT
Start: 2021-08-16 | End: 2021-11-12 | Stop reason: SDUPTHER

## 2021-09-29 ENCOUNTER — TELEPHONE (OUTPATIENT)
Dept: OPTOMETRY | Facility: CLINIC | Age: 41
End: 2021-09-29

## 2021-10-06 ENCOUNTER — OFFICE VISIT (OUTPATIENT)
Dept: OPTOMETRY | Facility: CLINIC | Age: 41
End: 2021-10-06
Payer: COMMERCIAL

## 2021-10-06 DIAGNOSIS — Z01.00 ROUTINE EYE EXAM: Primary | ICD-10-CM

## 2021-10-06 DIAGNOSIS — Z00.00 ROUTINE MEDICAL EXAM: ICD-10-CM

## 2021-10-06 PROCEDURE — 92015 DETERMINE REFRACTIVE STATE: CPT | Mod: S$GLB,,, | Performed by: OPTOMETRIST

## 2021-10-06 PROCEDURE — 92015 PR REFRACTION: ICD-10-PCS | Mod: S$GLB,,, | Performed by: OPTOMETRIST

## 2021-10-06 PROCEDURE — 92004 PR EYE EXAM, NEW PATIENT,COMPREHESV: ICD-10-PCS | Mod: S$GLB,,, | Performed by: OPTOMETRIST

## 2021-10-06 PROCEDURE — 92004 COMPRE OPH EXAM NEW PT 1/>: CPT | Mod: S$GLB,,, | Performed by: OPTOMETRIST

## 2021-10-06 PROCEDURE — 99999 PR PBB SHADOW E&M-EST. PATIENT-LVL II: CPT | Mod: PBBFAC,,, | Performed by: OPTOMETRIST

## 2021-10-06 PROCEDURE — 99999 PR PBB SHADOW E&M-EST. PATIENT-LVL II: ICD-10-PCS | Mod: PBBFAC,,, | Performed by: OPTOMETRIST

## 2021-10-21 ENCOUNTER — OFFICE VISIT (OUTPATIENT)
Dept: PODIATRY | Facility: CLINIC | Age: 41
End: 2021-10-21
Payer: COMMERCIAL

## 2021-10-21 VITALS
HEIGHT: 65 IN | SYSTOLIC BLOOD PRESSURE: 125 MMHG | HEART RATE: 77 BPM | DIASTOLIC BLOOD PRESSURE: 85 MMHG | BODY MASS INDEX: 36.54 KG/M2

## 2021-10-21 DIAGNOSIS — B35.1 ONYCHOMYCOSIS DUE TO DERMATOPHYTE: ICD-10-CM

## 2021-10-21 DIAGNOSIS — L30.9 DERMATITIS OF RIGHT FOOT: Primary | ICD-10-CM

## 2021-10-21 PROCEDURE — 3074F SYST BP LT 130 MM HG: CPT | Mod: CPTII,S$GLB,, | Performed by: PODIATRIST

## 2021-10-21 PROCEDURE — 3079F PR MOST RECENT DIASTOLIC BLOOD PRESSURE 80-89 MM HG: ICD-10-PCS | Mod: CPTII,S$GLB,, | Performed by: PODIATRIST

## 2021-10-21 PROCEDURE — 1159F PR MEDICATION LIST DOCUMENTED IN MEDICAL RECORD: ICD-10-PCS | Mod: CPTII,S$GLB,, | Performed by: PODIATRIST

## 2021-10-21 PROCEDURE — 99999 PR PBB SHADOW E&M-EST. PATIENT-LVL II: CPT | Mod: PBBFAC,,, | Performed by: PODIATRIST

## 2021-10-21 PROCEDURE — 3044F PR MOST RECENT HEMOGLOBIN A1C LEVEL <7.0%: ICD-10-PCS | Mod: CPTII,S$GLB,, | Performed by: PODIATRIST

## 2021-10-21 PROCEDURE — 99999 PR PBB SHADOW E&M-EST. PATIENT-LVL II: ICD-10-PCS | Mod: PBBFAC,,, | Performed by: PODIATRIST

## 2021-10-21 PROCEDURE — 3044F HG A1C LEVEL LT 7.0%: CPT | Mod: CPTII,S$GLB,, | Performed by: PODIATRIST

## 2021-10-21 PROCEDURE — 3008F PR BODY MASS INDEX (BMI) DOCUMENTED: ICD-10-PCS | Mod: CPTII,S$GLB,, | Performed by: PODIATRIST

## 2021-10-21 PROCEDURE — 3074F PR MOST RECENT SYSTOLIC BLOOD PRESSURE < 130 MM HG: ICD-10-PCS | Mod: CPTII,S$GLB,, | Performed by: PODIATRIST

## 2021-10-21 PROCEDURE — 1159F MED LIST DOCD IN RCRD: CPT | Mod: CPTII,S$GLB,, | Performed by: PODIATRIST

## 2021-10-21 PROCEDURE — 3079F DIAST BP 80-89 MM HG: CPT | Mod: CPTII,S$GLB,, | Performed by: PODIATRIST

## 2021-10-21 PROCEDURE — 99203 PR OFFICE/OUTPT VISIT, NEW, LEVL III, 30-44 MIN: ICD-10-PCS | Mod: S$GLB,,, | Performed by: PODIATRIST

## 2021-10-21 PROCEDURE — 3008F BODY MASS INDEX DOCD: CPT | Mod: CPTII,S$GLB,, | Performed by: PODIATRIST

## 2021-10-21 PROCEDURE — 99203 OFFICE O/P NEW LOW 30 MIN: CPT | Mod: S$GLB,,, | Performed by: PODIATRIST

## 2021-10-21 RX ORDER — CICLOPIROX 80 MG/ML
SOLUTION TOPICAL NIGHTLY
Qty: 6.6 ML | Refills: 1 | Status: SHIPPED | OUTPATIENT
Start: 2021-10-21 | End: 2021-11-04

## 2021-10-21 RX ORDER — TRIAMCINOLONE ACETONIDE 0.25 MG/G
OINTMENT TOPICAL 2 TIMES DAILY
Qty: 80 G | Refills: 0 | Status: SHIPPED | OUTPATIENT
Start: 2021-10-21 | End: 2022-11-10

## 2021-11-03 ENCOUNTER — PATIENT MESSAGE (OUTPATIENT)
Dept: PODIATRY | Facility: CLINIC | Age: 41
End: 2021-11-03
Payer: COMMERCIAL

## 2021-11-03 DIAGNOSIS — B35.1 ONYCHOMYCOSIS DUE TO DERMATOPHYTE: Primary | ICD-10-CM

## 2021-11-04 ENCOUNTER — PATIENT OUTREACH (OUTPATIENT)
Dept: ADMINISTRATIVE | Facility: OTHER | Age: 41
End: 2021-11-04
Payer: COMMERCIAL

## 2021-11-04 DIAGNOSIS — Z12.31 ENCOUNTER FOR SCREENING MAMMOGRAM FOR MALIGNANT NEOPLASM OF BREAST: Primary | ICD-10-CM

## 2021-11-04 RX ORDER — CICLOPIROX 80 MG/ML
SOLUTION TOPICAL NIGHTLY
Qty: 6.6 ML | Refills: 2 | Status: SHIPPED | OUTPATIENT
Start: 2021-11-04 | End: 2022-11-10

## 2021-11-09 ENCOUNTER — TELEPHONE (OUTPATIENT)
Dept: PHARMACY | Facility: CLINIC | Age: 41
End: 2021-11-09
Payer: COMMERCIAL

## 2021-11-12 ENCOUNTER — TELEPHONE (OUTPATIENT)
Dept: GASTROENTEROLOGY | Facility: CLINIC | Age: 41
End: 2021-11-12

## 2021-11-12 ENCOUNTER — OFFICE VISIT (OUTPATIENT)
Dept: GASTROENTEROLOGY | Facility: CLINIC | Age: 41
End: 2021-11-12
Payer: COMMERCIAL

## 2021-11-12 ENCOUNTER — LAB VISIT (OUTPATIENT)
Dept: LAB | Facility: HOSPITAL | Age: 41
End: 2021-11-12
Attending: INTERNAL MEDICINE
Payer: COMMERCIAL

## 2021-11-12 VITALS
WEIGHT: 225.06 LBS | HEIGHT: 65 IN | SYSTOLIC BLOOD PRESSURE: 122 MMHG | BODY MASS INDEX: 37.5 KG/M2 | DIASTOLIC BLOOD PRESSURE: 93 MMHG | HEART RATE: 71 BPM

## 2021-11-12 DIAGNOSIS — Z83.719 FAMILY HISTORY OF COLONIC POLYPS: ICD-10-CM

## 2021-11-12 DIAGNOSIS — Z79.899 ENCOUNTER FOR MONITORING LONG-TERM PROTON PUMP INHIBITOR THERAPY: ICD-10-CM

## 2021-11-12 DIAGNOSIS — K21.9 GASTROESOPHAGEAL REFLUX DISEASE, UNSPECIFIED WHETHER ESOPHAGITIS PRESENT: Primary | ICD-10-CM

## 2021-11-12 DIAGNOSIS — K21.9 GASTROESOPHAGEAL REFLUX DISEASE, UNSPECIFIED WHETHER ESOPHAGITIS PRESENT: ICD-10-CM

## 2021-11-12 DIAGNOSIS — E55.9 VITAMIN D INSUFFICIENCY: ICD-10-CM

## 2021-11-12 DIAGNOSIS — Z51.81 ENCOUNTER FOR MONITORING LONG-TERM PROTON PUMP INHIBITOR THERAPY: ICD-10-CM

## 2021-11-12 LAB
25(OH)D3+25(OH)D2 SERPL-MCNC: 24 NG/ML (ref 30–96)
HBV SURFACE AG SERPL QL IA: NEGATIVE
HEPATITIS A ANTIBODY, IGG: NEGATIVE
MAGNESIUM SERPL-MCNC: 1.7 MG/DL (ref 1.6–2.6)
VIT B12 SERPL-MCNC: 285 PG/ML (ref 210–950)

## 2021-11-12 PROCEDURE — 99999 PR PBB SHADOW E&M-EST. PATIENT-LVL III: CPT | Mod: PBBFAC,,, | Performed by: INTERNAL MEDICINE

## 2021-11-12 PROCEDURE — 99999 PR PBB SHADOW E&M-EST. PATIENT-LVL III: ICD-10-PCS | Mod: PBBFAC,,, | Performed by: INTERNAL MEDICINE

## 2021-11-12 PROCEDURE — 3044F PR MOST RECENT HEMOGLOBIN A1C LEVEL <7.0%: ICD-10-PCS | Mod: CPTII,S$GLB,, | Performed by: INTERNAL MEDICINE

## 2021-11-12 PROCEDURE — 3080F DIAST BP >= 90 MM HG: CPT | Mod: CPTII,S$GLB,, | Performed by: INTERNAL MEDICINE

## 2021-11-12 PROCEDURE — 83735 ASSAY OF MAGNESIUM: CPT | Performed by: INTERNAL MEDICINE

## 2021-11-12 PROCEDURE — 87340 HEPATITIS B SURFACE AG IA: CPT | Performed by: INTERNAL MEDICINE

## 2021-11-12 PROCEDURE — 3008F PR BODY MASS INDEX (BMI) DOCUMENTED: ICD-10-PCS | Mod: CPTII,S$GLB,, | Performed by: INTERNAL MEDICINE

## 2021-11-12 PROCEDURE — 3080F PR MOST RECENT DIASTOLIC BLOOD PRESSURE >= 90 MM HG: ICD-10-PCS | Mod: CPTII,S$GLB,, | Performed by: INTERNAL MEDICINE

## 2021-11-12 PROCEDURE — 3008F BODY MASS INDEX DOCD: CPT | Mod: CPTII,S$GLB,, | Performed by: INTERNAL MEDICINE

## 2021-11-12 PROCEDURE — 3074F PR MOST RECENT SYSTOLIC BLOOD PRESSURE < 130 MM HG: ICD-10-PCS | Mod: CPTII,S$GLB,, | Performed by: INTERNAL MEDICINE

## 2021-11-12 PROCEDURE — 99204 OFFICE O/P NEW MOD 45 MIN: CPT | Mod: S$GLB,,, | Performed by: INTERNAL MEDICINE

## 2021-11-12 PROCEDURE — 86790 VIRUS ANTIBODY NOS: CPT | Performed by: INTERNAL MEDICINE

## 2021-11-12 PROCEDURE — 3074F SYST BP LT 130 MM HG: CPT | Mod: CPTII,S$GLB,, | Performed by: INTERNAL MEDICINE

## 2021-11-12 PROCEDURE — 3044F HG A1C LEVEL LT 7.0%: CPT | Mod: CPTII,S$GLB,, | Performed by: INTERNAL MEDICINE

## 2021-11-12 PROCEDURE — 99204 PR OFFICE/OUTPT VISIT, NEW, LEVL IV, 45-59 MIN: ICD-10-PCS | Mod: S$GLB,,, | Performed by: INTERNAL MEDICINE

## 2021-11-12 PROCEDURE — 86706 HEP B SURFACE ANTIBODY: CPT | Performed by: INTERNAL MEDICINE

## 2021-11-12 PROCEDURE — 82607 VITAMIN B-12: CPT | Performed by: INTERNAL MEDICINE

## 2021-11-12 PROCEDURE — 82306 VITAMIN D 25 HYDROXY: CPT | Performed by: INTERNAL MEDICINE

## 2021-11-12 RX ORDER — OMEPRAZOLE 40 MG/1
40 CAPSULE, DELAYED RELEASE ORAL
Status: CANCELLED | OUTPATIENT
Start: 2021-11-12 | End: 2021-11-12

## 2021-11-12 RX ORDER — OMEPRAZOLE 40 MG/1
40 CAPSULE, DELAYED RELEASE ORAL
Qty: 90 CAPSULE | Refills: 1 | Status: SHIPPED | OUTPATIENT
Start: 2021-11-12 | End: 2023-11-27 | Stop reason: SDUPTHER

## 2021-11-15 ENCOUNTER — HOSPITAL ENCOUNTER (OUTPATIENT)
Dept: PREADMISSION TESTING | Facility: OTHER | Age: 41
Discharge: HOME | End: 2021-11-15
Attending: OTOLARYNGOLOGY
Payer: COMMERCIAL

## 2021-11-15 ENCOUNTER — PATIENT MESSAGE (OUTPATIENT)
Dept: GASTROENTEROLOGY | Facility: CLINIC | Age: 41
End: 2021-11-15
Payer: COMMERCIAL

## 2021-11-15 ENCOUNTER — ANESTHESIA EVENT (OUTPATIENT)
Dept: SURGERY | Facility: OTHER | Age: 41
End: 2021-11-15
Payer: COMMERCIAL

## 2021-11-15 VITALS
WEIGHT: 225 LBS | OXYGEN SATURATION: 99 % | DIASTOLIC BLOOD PRESSURE: 94 MMHG | SYSTOLIC BLOOD PRESSURE: 144 MMHG | BODY MASS INDEX: 37.44 KG/M2 | TEMPERATURE: 98 F | HEART RATE: 74 BPM

## 2021-11-15 DIAGNOSIS — Z23 ENCOUNTER FOR VACCINATION: Primary | ICD-10-CM

## 2021-11-15 DIAGNOSIS — E55.9 VITAMIN D INSUFFICIENCY: ICD-10-CM

## 2021-11-15 LAB
HBV SURFACE AB SER QL IA: POSITIVE
HBV SURFACE AB SERPL IA-ACNC: 261 MIU/ML

## 2021-11-15 RX ORDER — ACETAMINOPHEN 500 MG
1 TABLET ORAL DAILY
Qty: 90 CAPSULE | Refills: 3 | Status: SHIPPED | OUTPATIENT
Start: 2021-11-15 | End: 2021-12-02 | Stop reason: ALTCHOICE

## 2021-11-15 RX ORDER — SODIUM CHLORIDE, SODIUM LACTATE, POTASSIUM CHLORIDE, CALCIUM CHLORIDE 600; 310; 30; 20 MG/100ML; MG/100ML; MG/100ML; MG/100ML
INJECTION, SOLUTION INTRAVENOUS CONTINUOUS
Status: CANCELLED | OUTPATIENT
Start: 2021-11-15

## 2021-11-15 RX ORDER — LIDOCAINE HYDROCHLORIDE 10 MG/ML
0.5 INJECTION, SOLUTION EPIDURAL; INFILTRATION; INTRACAUDAL; PERINEURAL ONCE
Status: CANCELLED | OUTPATIENT
Start: 2021-11-15 | End: 2021-11-15

## 2021-11-15 RX ORDER — DEXAMETHASONE SODIUM PHOSPHATE 4 MG/ML
4 INJECTION, SOLUTION INTRA-ARTICULAR; INTRALESIONAL; INTRAMUSCULAR; INTRAVENOUS; SOFT TISSUE ONCE
Status: CANCELLED | OUTPATIENT
Start: 2021-11-17

## 2021-11-15 RX ORDER — ERGOCALCIFEROL 1.25 MG/1
50000 CAPSULE ORAL
Qty: 12 CAPSULE | Refills: 0 | Status: SHIPPED | OUTPATIENT
Start: 2021-11-15 | End: 2022-02-01

## 2021-11-17 ENCOUNTER — HOSPITAL ENCOUNTER (OUTPATIENT)
Facility: OTHER | Age: 41
Discharge: HOME | End: 2021-11-17
Attending: OTOLARYNGOLOGY | Admitting: OTOLARYNGOLOGY
Payer: COMMERCIAL

## 2021-11-17 ENCOUNTER — ANESTHESIA (OUTPATIENT)
Dept: SURGERY | Facility: OTHER | Age: 41
End: 2021-11-17
Payer: COMMERCIAL

## 2021-11-17 DIAGNOSIS — J35.3 T/A HYPERTROPHY: Primary | ICD-10-CM

## 2021-11-17 PROCEDURE — 25000003 PHARM REV CODE 250: Performed by: NURSE ANESTHETIST, CERTIFIED REGISTERED

## 2021-11-17 PROCEDURE — 88304 PR  SURG PATH,LEVEL III: ICD-10-PCS | Mod: 26,,, | Performed by: PATHOLOGY

## 2021-11-17 PROCEDURE — 25000003 PHARM REV CODE 250: Performed by: ANESTHESIOLOGY

## 2021-11-17 PROCEDURE — 63600175 PHARM REV CODE 636 W HCPCS: Performed by: ANESTHESIOLOGY

## 2021-11-17 PROCEDURE — 71000015 HC POSTOP RECOV 1ST HR: Performed by: OTOLARYNGOLOGY

## 2021-11-17 PROCEDURE — 37000009 HC ANESTHESIA EA ADD 15 MINS: Performed by: OTOLARYNGOLOGY

## 2021-11-17 PROCEDURE — 71000039 HC RECOVERY, EACH ADD'L HOUR: Performed by: OTOLARYNGOLOGY

## 2021-11-17 PROCEDURE — 90471 IMMUNIZATION ADMIN: CPT | Performed by: OTOLARYNGOLOGY

## 2021-11-17 PROCEDURE — 71000016 HC POSTOP RECOV ADDL HR: Performed by: OTOLARYNGOLOGY

## 2021-11-17 PROCEDURE — 36000707: Performed by: OTOLARYNGOLOGY

## 2021-11-17 PROCEDURE — 63600175 PHARM REV CODE 636 W HCPCS: Performed by: NURSE ANESTHETIST, CERTIFIED REGISTERED

## 2021-11-17 PROCEDURE — 63600175 PHARM REV CODE 636 W HCPCS: Performed by: OTOLARYNGOLOGY

## 2021-11-17 PROCEDURE — 36000706: Performed by: OTOLARYNGOLOGY

## 2021-11-17 PROCEDURE — 88304 TISSUE EXAM BY PATHOLOGIST: CPT | Mod: 26,,, | Performed by: PATHOLOGY

## 2021-11-17 PROCEDURE — 90686 IIV4 VACC NO PRSV 0.5 ML IM: CPT | Performed by: OTOLARYNGOLOGY

## 2021-11-17 PROCEDURE — 37000008 HC ANESTHESIA 1ST 15 MINUTES: Performed by: OTOLARYNGOLOGY

## 2021-11-17 PROCEDURE — 71000033 HC RECOVERY, INTIAL HOUR: Performed by: OTOLARYNGOLOGY

## 2021-11-17 PROCEDURE — 88304 TISSUE EXAM BY PATHOLOGIST: CPT | Mod: 59 | Performed by: PATHOLOGY

## 2021-11-17 PROCEDURE — 25000003 PHARM REV CODE 250: Performed by: OTOLARYNGOLOGY

## 2021-11-17 RX ORDER — DEXAMETHASONE SODIUM PHOSPHATE 4 MG/ML
4 INJECTION, SOLUTION INTRA-ARTICULAR; INTRALESIONAL; INTRAMUSCULAR; INTRAVENOUS; SOFT TISSUE ONCE
Status: DISCONTINUED | OUTPATIENT
Start: 2021-11-17 | End: 2021-11-17 | Stop reason: HOSPADM

## 2021-11-17 RX ORDER — EPINEPHRINE 0.1 MG/ML
INJECTION INTRAVENOUS
Status: DISCONTINUED | OUTPATIENT
Start: 2021-11-17 | End: 2021-11-17 | Stop reason: HOSPADM

## 2021-11-17 RX ORDER — DEXAMETHASONE SODIUM PHOSPHATE 4 MG/ML
INJECTION, SOLUTION INTRA-ARTICULAR; INTRALESIONAL; INTRAMUSCULAR; INTRAVENOUS; SOFT TISSUE
Status: DISCONTINUED | OUTPATIENT
Start: 2021-11-17 | End: 2021-11-17

## 2021-11-17 RX ORDER — OXYCODONE HYDROCHLORIDE 5 MG/1
5 TABLET ORAL
Status: DISCONTINUED | OUTPATIENT
Start: 2021-11-17 | End: 2021-11-17 | Stop reason: HOSPADM

## 2021-11-17 RX ORDER — AMOXICILLIN 400 MG/5ML
500 POWDER, FOR SUSPENSION ORAL EVERY 12 HOURS
Qty: 100 ML | Refills: 0 | Status: SHIPPED | OUTPATIENT
Start: 2021-11-17 | End: 2021-12-02 | Stop reason: ALTCHOICE

## 2021-11-17 RX ORDER — MEPERIDINE HYDROCHLORIDE 25 MG/ML
12.5 INJECTION INTRAMUSCULAR; INTRAVENOUS; SUBCUTANEOUS ONCE AS NEEDED
Status: DISCONTINUED | OUTPATIENT
Start: 2021-11-17 | End: 2021-11-17 | Stop reason: HOSPADM

## 2021-11-17 RX ORDER — PROPOFOL 10 MG/ML
VIAL (ML) INTRAVENOUS CONTINUOUS PRN
Status: DISCONTINUED | OUTPATIENT
Start: 2021-11-17 | End: 2021-11-17

## 2021-11-17 RX ORDER — ROCURONIUM BROMIDE 10 MG/ML
INJECTION, SOLUTION INTRAVENOUS
Status: DISCONTINUED | OUTPATIENT
Start: 2021-11-17 | End: 2021-11-17

## 2021-11-17 RX ORDER — LIDOCAINE HYDROCHLORIDE 10 MG/ML
0.5 INJECTION, SOLUTION EPIDURAL; INFILTRATION; INTRACAUDAL; PERINEURAL ONCE
Status: DISCONTINUED | OUTPATIENT
Start: 2021-11-17 | End: 2021-11-17 | Stop reason: HOSPADM

## 2021-11-17 RX ORDER — HYDROMORPHONE HYDROCHLORIDE 2 MG/ML
0.4 INJECTION, SOLUTION INTRAMUSCULAR; INTRAVENOUS; SUBCUTANEOUS EVERY 5 MIN PRN
Status: DISCONTINUED | OUTPATIENT
Start: 2021-11-17 | End: 2021-11-17 | Stop reason: HOSPADM

## 2021-11-17 RX ORDER — FENTANYL CITRATE 50 UG/ML
INJECTION, SOLUTION INTRAMUSCULAR; INTRAVENOUS
Status: DISCONTINUED | OUTPATIENT
Start: 2021-11-17 | End: 2021-11-17

## 2021-11-17 RX ORDER — DEXMEDETOMIDINE HYDROCHLORIDE 100 UG/ML
INJECTION, SOLUTION INTRAVENOUS
Status: DISCONTINUED | OUTPATIENT
Start: 2021-11-17 | End: 2021-11-17

## 2021-11-17 RX ORDER — ONDANSETRON 2 MG/ML
4 INJECTION INTRAMUSCULAR; INTRAVENOUS DAILY PRN
Status: DISCONTINUED | OUTPATIENT
Start: 2021-11-17 | End: 2021-11-17 | Stop reason: HOSPADM

## 2021-11-17 RX ORDER — SODIUM CHLORIDE, SODIUM LACTATE, POTASSIUM CHLORIDE, CALCIUM CHLORIDE 600; 310; 30; 20 MG/100ML; MG/100ML; MG/100ML; MG/100ML
INJECTION, SOLUTION INTRAVENOUS CONTINUOUS
Status: DISCONTINUED | OUTPATIENT
Start: 2021-11-17 | End: 2021-11-17 | Stop reason: HOSPADM

## 2021-11-17 RX ORDER — BUPIVACAINE HYDROCHLORIDE 5 MG/ML
INJECTION, SOLUTION EPIDURAL; INTRACAUDAL
Status: DISCONTINUED | OUTPATIENT
Start: 2021-11-17 | End: 2021-11-17 | Stop reason: HOSPADM

## 2021-11-17 RX ORDER — ONDANSETRON 2 MG/ML
INJECTION INTRAMUSCULAR; INTRAVENOUS
Status: DISCONTINUED | OUTPATIENT
Start: 2021-11-17 | End: 2021-11-17

## 2021-11-17 RX ORDER — PREDNISOLONE SODIUM PHOSPHATE 15 MG/5ML
15 SOLUTION ORAL DAILY
Qty: 50 ML | Refills: 0 | Status: SHIPPED | OUTPATIENT
Start: 2021-11-17 | End: 2021-11-27

## 2021-11-17 RX ORDER — PROPOFOL 10 MG/ML
VIAL (ML) INTRAVENOUS
Status: DISCONTINUED | OUTPATIENT
Start: 2021-11-17 | End: 2021-11-17

## 2021-11-17 RX ORDER — HYDROCODONE BITARTRATE AND ACETAMINOPHEN 7.5; 325 MG/15ML; MG/15ML
15 SOLUTION ORAL EVERY 4 HOURS PRN
Qty: 480 ML | Refills: 0 | Status: SHIPPED | OUTPATIENT
Start: 2021-11-17 | End: 2021-12-02 | Stop reason: ALTCHOICE

## 2021-11-17 RX ORDER — SODIUM CHLORIDE 0.9 % (FLUSH) 0.9 %
3 SYRINGE (ML) INJECTION
Status: DISCONTINUED | OUTPATIENT
Start: 2021-11-17 | End: 2021-11-17 | Stop reason: HOSPADM

## 2021-11-17 RX ORDER — LIDOCAINE HCL/PF 100 MG/5ML
SYRINGE (ML) INTRAVENOUS
Status: DISCONTINUED | OUTPATIENT
Start: 2021-11-17 | End: 2021-11-17

## 2021-11-17 RX ORDER — ONDANSETRON 8 MG/1
8 TABLET, ORALLY DISINTEGRATING ORAL EVERY 8 HOURS PRN
Qty: 6 TABLET | Refills: 1 | Status: SHIPPED | OUTPATIENT
Start: 2021-11-17 | End: 2021-12-02 | Stop reason: ALTCHOICE

## 2021-11-17 RX ADMIN — DEXMEDETOMIDINE HYDROCHLORIDE 5 MCG: 100 INJECTION, SOLUTION, CONCENTRATE INTRAVENOUS at 07:11

## 2021-11-17 RX ADMIN — FENTANYL CITRATE 50 MCG: 50 INJECTION, SOLUTION INTRAMUSCULAR; INTRAVENOUS at 07:11

## 2021-11-17 RX ADMIN — FENTANYL CITRATE 50 MCG: 50 INJECTION, SOLUTION INTRAMUSCULAR; INTRAVENOUS at 06:11

## 2021-11-17 RX ADMIN — PROPOFOL 100 MCG/KG/MIN: 10 INJECTION, EMULSION INTRAVENOUS at 07:11

## 2021-11-17 RX ADMIN — LIDOCAINE HYDROCHLORIDE 100 MG: 20 INJECTION, SOLUTION INTRAVENOUS at 07:11

## 2021-11-17 RX ADMIN — ESMOLOL HYDROCHLORIDE 20 MG: 10 INJECTION INTRAVENOUS at 07:11

## 2021-11-17 RX ADMIN — CARBOXYMETHYLCELLULOSE SODIUM 2 DROP: 2.5 SOLUTION/ DROPS OPHTHALMIC at 07:11

## 2021-11-17 RX ADMIN — SUGAMMADEX 200 MG: 100 INJECTION, SOLUTION INTRAVENOUS at 07:11

## 2021-11-17 RX ADMIN — DEXAMETHASONE SODIUM PHOSPHATE 12 MG: 4 INJECTION, SOLUTION INTRAMUSCULAR; INTRAVENOUS at 07:11

## 2021-11-17 RX ADMIN — ONDANSETRON 4 MG: 2 INJECTION INTRAMUSCULAR; INTRAVENOUS at 09:11

## 2021-11-17 RX ADMIN — INFLUENZA VIRUS VACCINE 0.5 ML: 15; 15; 15; 15 SUSPENSION INTRAMUSCULAR at 11:11

## 2021-11-17 RX ADMIN — ROCURONIUM BROMIDE 40 MG: 10 INJECTION, SOLUTION INTRAVENOUS at 07:11

## 2021-11-17 RX ADMIN — PROPOFOL 50 MG: 10 INJECTION, EMULSION INTRAVENOUS at 07:11

## 2021-11-17 RX ADMIN — OXYCODONE 5 MG: 5 TABLET ORAL at 10:11

## 2021-11-17 RX ADMIN — ONDANSETRON HYDROCHLORIDE 4 MG: 2 INJECTION INTRAMUSCULAR; INTRAVENOUS at 07:11

## 2021-11-17 RX ADMIN — PROPOFOL 200 MG: 10 INJECTION, EMULSION INTRAVENOUS at 07:11

## 2021-11-18 VITALS
BODY MASS INDEX: 37.49 KG/M2 | HEART RATE: 101 BPM | DIASTOLIC BLOOD PRESSURE: 85 MMHG | SYSTOLIC BLOOD PRESSURE: 146 MMHG | OXYGEN SATURATION: 98 % | TEMPERATURE: 98 F | WEIGHT: 225 LBS | RESPIRATION RATE: 16 BRPM | HEIGHT: 65 IN

## 2021-11-22 ENCOUNTER — PATIENT MESSAGE (OUTPATIENT)
Dept: MEDSURG UNIT | Facility: HOSPITAL | Age: 41
End: 2021-11-22
Payer: COMMERCIAL

## 2021-11-23 ENCOUNTER — TELEPHONE (OUTPATIENT)
Dept: ENDOSCOPY | Facility: HOSPITAL | Age: 41
End: 2021-11-23
Payer: COMMERCIAL

## 2021-11-24 LAB
FINAL PATHOLOGIC DIAGNOSIS: NORMAL
Lab: NORMAL

## 2021-11-29 DIAGNOSIS — Z12.11 ENCOUNTER FOR SCREENING COLONOSCOPY: Primary | ICD-10-CM

## 2021-12-02 ENCOUNTER — OFFICE VISIT (OUTPATIENT)
Dept: PRIMARY CARE CLINIC | Facility: CLINIC | Age: 41
End: 2021-12-02
Payer: COMMERCIAL

## 2021-12-02 ENCOUNTER — PATIENT MESSAGE (OUTPATIENT)
Dept: PRIMARY CARE CLINIC | Facility: CLINIC | Age: 41
End: 2021-12-02

## 2021-12-02 VITALS
RESPIRATION RATE: 18 BRPM | WEIGHT: 222.88 LBS | BODY MASS INDEX: 37.13 KG/M2 | SYSTOLIC BLOOD PRESSURE: 136 MMHG | OXYGEN SATURATION: 99 % | HEART RATE: 63 BPM | TEMPERATURE: 98 F | DIASTOLIC BLOOD PRESSURE: 86 MMHG | HEIGHT: 65 IN

## 2021-12-02 DIAGNOSIS — B35.3 TINEA PEDIS OF BOTH FEET: ICD-10-CM

## 2021-12-02 DIAGNOSIS — I10 HTN (HYPERTENSION), BENIGN: Primary | ICD-10-CM

## 2021-12-02 DIAGNOSIS — E53.8 VITAMIN B12 DEFICIENCY: ICD-10-CM

## 2021-12-02 PROCEDURE — 99214 PR OFFICE/OUTPT VISIT, EST, LEVL IV, 30-39 MIN: ICD-10-PCS | Mod: S$GLB,,, | Performed by: FAMILY MEDICINE

## 2021-12-02 PROCEDURE — 99999 PR PBB SHADOW E&M-EST. PATIENT-LVL IV: CPT | Mod: PBBFAC,,, | Performed by: FAMILY MEDICINE

## 2021-12-02 PROCEDURE — 99999 PR PBB SHADOW E&M-EST. PATIENT-LVL IV: ICD-10-PCS | Mod: PBBFAC,,, | Performed by: FAMILY MEDICINE

## 2021-12-02 PROCEDURE — 99214 OFFICE O/P EST MOD 30 MIN: CPT | Mod: S$GLB,,, | Performed by: FAMILY MEDICINE

## 2021-12-02 RX ORDER — CLOTRIMAZOLE AND BETAMETHASONE DIPROPIONATE 10; .64 MG/G; MG/G
CREAM TOPICAL 2 TIMES DAILY
Qty: 60 G | Refills: 0 | Status: SHIPPED | OUTPATIENT
Start: 2021-12-02 | End: 2022-11-10

## 2021-12-02 RX ORDER — AMLODIPINE BESYLATE 2.5 MG/1
5 TABLET ORAL DAILY
Qty: 90 TABLET | Refills: 1 | Status: SHIPPED | OUTPATIENT
Start: 2021-12-02 | End: 2021-12-02

## 2021-12-02 RX ORDER — AMLODIPINE BESYLATE 2.5 MG/1
2.5 TABLET ORAL DAILY
Qty: 90 TABLET | Refills: 1 | Status: SHIPPED | OUTPATIENT
Start: 2021-12-02 | End: 2022-02-03

## 2021-12-03 ENCOUNTER — PATIENT MESSAGE (OUTPATIENT)
Dept: PRIMARY CARE CLINIC | Facility: CLINIC | Age: 41
End: 2021-12-03
Payer: COMMERCIAL

## 2022-01-25 ENCOUNTER — PATIENT MESSAGE (OUTPATIENT)
Dept: PRIMARY CARE CLINIC | Facility: CLINIC | Age: 42
End: 2022-01-25
Payer: COMMERCIAL

## 2022-02-03 ENCOUNTER — PATIENT MESSAGE (OUTPATIENT)
Dept: PRIMARY CARE CLINIC | Facility: CLINIC | Age: 42
End: 2022-02-03
Payer: COMMERCIAL

## 2022-02-03 RX ORDER — AMLODIPINE BESYLATE 10 MG/1
10 TABLET ORAL DAILY
Qty: 90 TABLET | Refills: 1 | Status: SHIPPED | OUTPATIENT
Start: 2022-02-03 | End: 2022-11-10

## 2022-03-18 DIAGNOSIS — Z12.11 SPECIAL SCREENING FOR MALIGNANT NEOPLASMS, COLON: Primary | ICD-10-CM

## 2022-03-18 RX ORDER — SODIUM, POTASSIUM,MAG SULFATES 17.5-3.13G
1 SOLUTION, RECONSTITUTED, ORAL ORAL DAILY
Qty: 1 KIT | Refills: 0 | Status: SHIPPED | OUTPATIENT
Start: 2022-03-18 | End: 2022-03-19

## 2022-07-18 DIAGNOSIS — Z12.11 SPECIAL SCREENING FOR MALIGNANT NEOPLASMS, COLON: Primary | ICD-10-CM

## 2022-07-18 RX ORDER — SODIUM, POTASSIUM,MAG SULFATES 17.5-3.13G
1 SOLUTION, RECONSTITUTED, ORAL ORAL DAILY
Qty: 1 KIT | Refills: 0 | Status: SHIPPED | OUTPATIENT
Start: 2022-07-18 | End: 2022-07-20

## 2022-09-16 ENCOUNTER — PATIENT MESSAGE (OUTPATIENT)
Dept: ENDOSCOPY | Facility: HOSPITAL | Age: 42
End: 2022-09-16
Payer: COMMERCIAL

## 2022-11-07 ENCOUNTER — TELEPHONE (OUTPATIENT)
Dept: PRIMARY CARE CLINIC | Facility: CLINIC | Age: 42
End: 2022-11-07
Payer: COMMERCIAL

## 2022-11-07 NOTE — PROGRESS NOTES
[facility] Note    Chief Complaint    No chief complaint on file.      History of Present Illness      Akilah Parker is a 42 y.o. female with chronic conditions of who presents today for:      Patient Care Team:  Tran Hanson MD as PCP - General (Internal Medicine)  Consuelo Ortiz LPN as Care Coordinator     Health Maintenance:  Immunization History   Administered Date(s) Administered    COVID-19, MRNA, LN-S, PF (Pfizer) (Purple Cap) 01/02/2021, 01/23/2021    DTaP 07/23/2021    Influenza 10/06/2016, 10/02/2017, 10/05/2018, 10/07/2019, 11/10/2020    Influenza - Quadrivalent - PF *Preferred* (6 months and older) 10/06/2016, 10/02/2017, 10/05/2018, 10/07/2019, 11/10/2020, 11/17/2021    Tdap 07/23/2021      Health Maintenance   Topic Date Due    Mammogram  10/28/2022    TETANUS VACCINE  07/23/2031    Hepatitis C Screening  Completed    Lipid Panel  Completed        Past Medical History:  Past Medical History:   Diagnosis Date    Acne vulgaris     Ganglion cyst     bilateral     GERD (gastroesophageal reflux disease)     History of kidney stones 2012    Hypertension     IN PAST    CHARISSE (iron deficiency anemia)     PONV (postoperative nausea and vomiting)     Thyroid nodule 2014    Scattered micro nodules, stable in size compared to 2014       Past Surgical History:   has a past surgical history that includes Tubal ligation; ORIF foot fracture; OTHER SURGICAL HISTORY; Laparoscopic total hysterectomy (N/A, 8/5/2020); Salpingectomy (Bilateral, 8/5/2020); and TONSILLECTOMY, ADENOIDECTOMY (Bilateral, 11/17/2021).    Family History:  family history includes Aneurysm in her maternal grandmother; Colon polyps in her mother; Heart disease in her maternal grandfather and paternal grandmother; Hyperlipidemia in her father; Hypertension in her father and mother; Stroke in her father.     Social History:  Social History     Tobacco Use    Smoking status: Never    Smokeless tobacco: Never   Substance Use Topics     Alcohol use: Yes     Comment: occassionally    Drug use: No       Review of Systems     Medications:    Current Outpatient Medications:     amLODIPine (NORVASC) 10 MG tablet, Take 1 tablet (10 mg total) by mouth once daily., Disp: 90 tablet, Rfl: 1    ciclopirox (PENLAC) 8 % Soln, Apply topically nightly. Remove with nail polish remover once a week, Disp: 6.6 mL, Rfl: 2    clotrimazole-betamethasone 1-0.05% (LOTRISONE) cream, Apply topically 2 (two) times daily., Disp: 60 g, Rfl: 0    omeprazole (PRILOSEC) 40 MG capsule, Take 1 capsule (40 mg total) by mouth before breakfast. Take one pill every morning 45 minutes before breakfast, Disp: 90 capsule, Rfl: 1    triamcinolone acetonide 0.025% (KENALOG) 0.025 % Oint, Apply topically 2 (two) times daily. for 14 days, Disp: 80 g, Rfl: 0     Allergies:  Review of patient's allergies indicates:  No Known Allergies    Physical Exam                    Physical Exam     Laboratory:  CBC:  Recent Labs   Lab 05/12/20  1348 07/27/20  1430 07/26/21  1038   WBC 4.98 3.58 L 4.32   RBC 3.70 L 3.73 L 4.25   Hemoglobin 8.5 L 8.6 L 12.9   Hematocrit 28.5 L 29.0 L 39.3   Platelets 418 H 358 H 268   MCV 77 L 78 L 93   MCH 23.0 L 23.1 L 30.4   MCHC 29.8 L 29.7 L 32.8       CMP:  Recent Labs   Lab 05/12/20  1348 07/27/20  1430 07/26/21  1038   Glucose 79 77 86   Calcium 9.0 8.4 L 10.0   Albumin 3.6 3.4 L 3.4 L   Total Protein 7.3 6.9 7.4   Sodium 141 139 139   Potassium 3.7 3.7 4.0   CO2 25 28 21 L   Chloride 107 107 106   BUN 10 12 14   Creatinine 0.9 1.0 0.9   Alkaline Phosphatase 65 62 71   ALT 8 L 6 L 10   AST 14 12 13   Total Bilirubin 0.4 0.3 0.4           URINALYSIS:         LIPIDS:  Recent Labs   Lab 05/12/20  1348 07/26/21  1038   TSH 1.523 1.075   HDL 49 54   Cholesterol 173 210 H   Triglycerides 49 67   LDL Cholesterol 114.2 142.6   HDL/Cholesterol Ratio 28.3 25.7   Non-HDL Cholesterol 124 156   Total Cholesterol/HDL Ratio 3.5 3.9       TSH:  Recent Labs   Lab 05/12/20  1348  07/26/21  1038   TSH 1.523 1.075       A1C:  Recent Labs   Lab 07/27/20  1430 07/26/21  1038   Hemoglobin A1C 5.3 5.2       Urine Microalbumin/Cr:          Other:   Recent Labs   Lab 05/12/20  1348 07/26/21  1038 11/12/21  0952   Vit D, 25-Hydroxy  --  18 L 24 L   Vitamin B-12 253  --  285   Ferritin 3 L  --   --    Iron 20 L  --   --    Transferrin 365  --   --    TIBC 540 H  --   --    Saturated Iron 4 L  --   --      Recent Labs   Lab 07/26/21  1038   Hepatitis C Ab Negative       Assessment/Plan     Akilah Parker is a 42 y.o.female with:    There are no diagnoses linked to this encounter.     Chronic conditions status updated as per HPI.  Other than changes above, cont current medications and maintain follow up with specialists.  Return to clinic in No follow-ups on file.      Maylin Oneal MD  [facility]

## 2022-11-07 NOTE — TELEPHONE ENCOUNTER
I sw pt. She just needs her annual exam. Pt is aware that you are not accepting new patients and not expecting to do so. States she is in the process of finding a new pcp

## 2022-11-07 NOTE — TELEPHONE ENCOUNTER
----- Message from Maylin Oneal MD sent at 11/6/2022  6:16 PM CST -----  I see pt is a prev Dr. Hanson pt and made an appt for an annual with me.  Unfortunately, she needs to reschedule with a provider who is accepting new pts    Dr. FAULKNER

## 2022-11-08 NOTE — PROGRESS NOTES
[facility] Note    Chief Complaint      Chief Complaint   Patient presents with    Annual Exam       History of Present Illness      Akilah Parker is a 42 y.o.  F with HTN, MNG, Vit B12 def,  LE edema, Insomnia, DDD L spine, Ganglion cyst presents to fu well visit. Pt is aware needs to est with a new PCP accepting new pts. and would like to fu again with Dr. Yoo    HTN - Pt off Amlodipine 10 mg/d x 3 mos. Prev did not feel HCTZ was helping and prev.stopped it but resumed it.  She is a NP. Monitor for edema. She reports pedal edema worse on Rt since h/o ORIF.    GERD - Pt on Omeprazole prn. Cont reflux rec.     MNG - since teens.  Last U/S -14. Will repeat.      Vit B12 def - not taking Vit B12. Rec she take Otc Vit B12 1000 mcg/d.    Vit D - Not on vit D suppl.  Rec Vit D3 1000 u/d.  Repeat level.     LE edema - Sable on HCTZ.     H/o nephrolithiasis - Rec adeq hydration.     DDD L spine - Stable.     Ganglion cyst - Rt Wrist - Fu by Love Rodriguez.     H/o CHARISSE - Resolved s/p TLH.     HCM - Flu - 10/14/22;  Tdap - 7/23/21;  PCV 13 - none;  PVX 23 - none;  Shingrix - due at 6 0 y.o[age]  COVID - 19 Vaccine #1 1/2/21 and #2 1/23/21; MGM - has appt 12 /7/22;  DEXA - none;  PAP - s/p TLH due to fibroids; Hep C Screen - 7/26/21 - neg.;  HIV Screen - 5/12/20; C-scope - is sched;  Prev PCP - Dr. Hanson; OB/GYN - Dr. San; ENT - Dr. Henning;  Hand Sx - Dr. Rodriguez; GI - Dr. Grimes      Patient Care Team:  Tran Hanson MD as PCP - General (Internal Medicine)  Consuelo Ortiz LPN as Care Coordinator     Health Maintenance:  Immunization History   Administered Date(s) Administered    COVID-19, MRNA, LN-S, PF (Pfizer) (Purple Cap) 01/02/2021, 01/23/2021    DTaP 07/23/2021    Influenza 10/06/2016, 10/02/2017, 10/05/2018, 10/07/2019, 11/10/2020    Influenza - Quadrivalent - PF *Preferred* (6 months and older) 10/06/2016, 10/02/2017, 10/05/2018, 10/07/2019, 11/10/2020, 11/17/2021, 10/14/2022    Tdap  07/23/2021      Health Maintenance   Topic Date Due    Mammogram  10/28/2022    TETANUS VACCINE  07/23/2031    Hepatitis C Screening  Completed    Lipid Panel  Completed        Past Medical History:  Past Medical History:   Diagnosis Date    Acne vulgaris     Fibroids, intramural 05/27/2020    Ganglion cyst     bilateral     GERD (gastroesophageal reflux disease)     History of kidney stones 2012    Hypertension     IN PAST    CHARISSE (iron deficiency anemia)     Menorrhagia with regular cycle 05/12/2020    Nephrolithiasis     PONV (postoperative nausea and vomiting)     Thyroid nodule 2014    Scattered micro nodules, stable in size compared to 2014       Past Surgical History:   has a past surgical history that includes Tubal ligation; ORIF foot fracture; OTHER SURGICAL HISTORY; Laparoscopic total hysterectomy (N/A, 8/5/2020); Salpingectomy (Bilateral, 8/5/2020); and TONSILLECTOMY, ADENOIDECTOMY (Bilateral, 11/17/2021).    Family History:  family history includes Aneurysm in her maternal grandmother; Colon polyps in her mother; Heart disease in her maternal grandfather and paternal grandmother; Hyperlipidemia in her father; Hypertension in her father and mother; Stroke (age of onset: 57) in her father.     Social History:  Social History     Tobacco Use    Smoking status: Never    Smokeless tobacco: Never   Substance Use Topics    Alcohol use: Yes     Comment: occassionally    Drug use: No       Review of Systems   Constitutional:  Negative for chills, diaphoresis and fever.   HENT:  Negative for hearing loss, rhinorrhea and sore throat.    Eyes:  Negative for visual disturbance.   Respiratory:  Negative for shortness of breath.    Cardiovascular:  Positive for leg swelling. Negative for chest pain and palpitations.        Improved on HCTZ.    Gastrointestinal:  Positive for reflux. Negative for abdominal pain, blood in stool, constipation, diarrhea, nausea and vomiting.        On occasion.   Endocrine: Negative for  "cold intolerance, heat intolerance and polydipsia.   Genitourinary:  Negative for bladder incontinence, dysuria, flank pain, frequency and hematuria.   Neurological:  Negative for dizziness and headaches.   Psychiatric/Behavioral:  Negative for dysphoric mood. The patient is not nervous/anxious.       Medications:    Current Outpatient Medications:     hydroCHLOROthiazide (HYDRODIURIL) 25 MG tablet, Take 25 mg by mouth once daily., Disp: , Rfl:     omeprazole (PRILOSEC) 40 MG capsule, Take 1 capsule (40 mg total) by mouth before breakfast. Take one pill every morning 45 minutes before breakfast, Disp: 90 capsule, Rfl: 1     Allergies:  Review of patient's allergies indicates:  No Known Allergies    Physical Exam      Vital Signs  Temp: 98 °F (36.7 °C)  Temp src: Oral  Pulse: 80  Resp: 16  SpO2: 99 %  BP: 128/86  BP Location: Left arm  Patient Position: Sitting  Pain Score: 0-No pain  Height and Weight  Height: 5' 5" (165.1 cm)  Weight: 105.3 kg (232 lb 2.3 oz)  BSA (Calculated - sq m): 2.2 sq meters  BMI (Calculated): 38.6  Weight in (lb) to have BMI = 25: 149.9      Patient Position: Sitting      Physical Exam  Vitals reviewed.   Constitutional:       General: She is not in acute distress.     Appearance: Normal appearance. She is not ill-appearing, toxic-appearing or diaphoretic.   HENT:      Head: Normocephalic and atraumatic.      Right Ear: Tympanic membrane normal.      Left Ear: Tympanic membrane normal.      Mouth/Throat:      Mouth: Mucous membranes are moist.      Pharynx: No posterior oropharyngeal erythema.   Eyes:      Extraocular Movements: Extraocular movements intact.      Conjunctiva/sclera: Conjunctivae normal.      Pupils: Pupils are equal, round, and reactive to light.   Neck:      Vascular: No carotid bruit.   Cardiovascular:      Rate and Rhythm: Normal rate and regular rhythm.      Pulses: Normal pulses.      Heart sounds: Normal heart sounds. No murmur heard.  Pulmonary:      Effort: No " respiratory distress.      Breath sounds: Normal breath sounds. No wheezing.   Abdominal:      General: Bowel sounds are normal. There is no distension.      Palpations: Abdomen is soft.      Tenderness: There is no abdominal tenderness. There is no guarding or rebound.   Skin:     General: Skin is warm.   Neurological:      General: No focal deficit present.      Mental Status: She is alert and oriented to person, place, and time.   Psychiatric:         Mood and Affect: Mood normal.         Behavior: Behavior normal.        Laboratory:  CBC:  Recent Labs   Lab 05/12/20 1348 07/27/20 1430 07/26/21  1038   WBC 4.98 3.58 L 4.32   RBC 3.70 L 3.73 L 4.25   Hemoglobin 8.5 L 8.6 L 12.9   Hematocrit 28.5 L 29.0 L 39.3   Platelets 418 H 358 H 268   MCV 77 L 78 L 93   MCH 23.0 L 23.1 L 30.4   MCHC 29.8 L 29.7 L 32.8       CMP:  Recent Labs   Lab 05/12/20 1348 07/27/20 1430 07/26/21  1038   Glucose 79 77 86   Calcium 9.0 8.4 L 10.0   Albumin 3.6 3.4 L 3.4 L   Total Protein 7.3 6.9 7.4   Sodium 141 139 139   Potassium 3.7 3.7 4.0   CO2 25 28 21 L   Chloride 107 107 106   BUN 10 12 14   Creatinine 0.9 1.0 0.9   Alkaline Phosphatase 65 62 71   ALT 8 L 6 L 10   AST 14 12 13   Total Bilirubin 0.4 0.3 0.4       LIPIDS:  Recent Labs   Lab 05/12/20 1348 07/26/21  1038   TSH 1.523 1.075   HDL 49 54   Cholesterol 173 210 H   Triglycerides 49 67   LDL Cholesterol 114.2 142.6   HDL/Cholesterol Ratio 28.3 25.7   Non-HDL Cholesterol 124 156   Total Cholesterol/HDL Ratio 3.5 3.9       TSH:  Recent Labs   Lab 05/12/20 1348 07/26/21  1038   TSH 1.523 1.075       A1C:  Recent Labs   Lab 07/27/20 1430 07/26/21  1038   Hemoglobin A1C 5.3 5.2       Other:   Recent Labs   Lab 05/12/20 1348 07/26/21  1038 11/12/21  0952   Vit D, 25-Hydroxy  --  18 L 24 L   Vitamin B-12 253  --  285   Ferritin 3 L  --   --    Iron 20 L  --   --    Transferrin 365  --   --    TIBC 540 H  --   --    Saturated Iron 4 L  --   --      Recent Labs   Lab  07/26/21  1038   Hepatitis C Ab Negative       Assessment/Plan     Akilah Parker is a 42 y.o.female with:    Normal physical exam, routine  -     CBC Auto Differential; Future; Expected date: 11/10/2022  -     Comprehensive Metabolic Panel; Future; Expected date: 11/10/2022  -     TSH; Future; Expected date: 11/10/2022  -     T4, Free; Future; Expected date: 11/10/2022  -     Hemoglobin A1C; Future; Expected date: 11/10/2022  - Performed today.  Will check Basic labs.  RTC in 1 yr for fu or sooner if needed     HTN (hypertension), benign  - Controlled.  Cont current.     Vitamin D deficiency  -     Calcitriol; Future; Expected date: 11/10/2022  - Repeat level. Rec Vit D 3 1000 u/d.    Vitamin B12 deficiency  - Rec OTC Vit B12 1000 mcg/d.  Can check level in future.     Multiple thyroid nodules  -     US Soft Tissue Head Neck Thyroid; Future; Expected date: 11/10/2022  - Repeat U/S.     Bilateral leg edema  - Rec elev BLE, low sodium diet, and cont.  compression stockings/socks prn. Stable on HCTZ.     Class 2 obesity due to excess calories with body mass index (BMI) of 38.0 to 38.9 in adult, unspecified whether serious comorbidity present  -     Hemoglobin A1C; Future; Expected date: 11/10/2022   - Rec low carb diet and exercise as discussed for wt loss.      Screening for lipoid disorders  -     Lipid Panel; Future       Chronic conditions status updated as per HPI.  Other than changes above, cont current medications and maintain follow up with specialists.         Maylin Oneal MD  [facility]

## 2022-11-10 ENCOUNTER — OFFICE VISIT (OUTPATIENT)
Dept: PRIMARY CARE CLINIC | Facility: CLINIC | Age: 42
End: 2022-11-10
Payer: COMMERCIAL

## 2022-11-10 ENCOUNTER — LAB VISIT (OUTPATIENT)
Dept: LAB | Facility: HOSPITAL | Age: 42
End: 2022-11-10
Attending: INTERNAL MEDICINE
Payer: COMMERCIAL

## 2022-11-10 VITALS
TEMPERATURE: 98 F | RESPIRATION RATE: 16 BRPM | BODY MASS INDEX: 38.67 KG/M2 | SYSTOLIC BLOOD PRESSURE: 128 MMHG | DIASTOLIC BLOOD PRESSURE: 86 MMHG | WEIGHT: 232.13 LBS | HEIGHT: 65 IN | HEART RATE: 80 BPM | OXYGEN SATURATION: 99 %

## 2022-11-10 DIAGNOSIS — R60.0 BILATERAL LEG EDEMA: ICD-10-CM

## 2022-11-10 DIAGNOSIS — E04.2 MULTIPLE THYROID NODULES: ICD-10-CM

## 2022-11-10 DIAGNOSIS — Z13.220 SCREENING FOR LIPOID DISORDERS: ICD-10-CM

## 2022-11-10 DIAGNOSIS — Z00.00 NORMAL PHYSICAL EXAM, ROUTINE: ICD-10-CM

## 2022-11-10 DIAGNOSIS — E66.09 CLASS 2 OBESITY DUE TO EXCESS CALORIES WITH BODY MASS INDEX (BMI) OF 38.0 TO 38.9 IN ADULT, UNSPECIFIED WHETHER SERIOUS COMORBIDITY PRESENT: ICD-10-CM

## 2022-11-10 DIAGNOSIS — E55.9 VITAMIN D DEFICIENCY: ICD-10-CM

## 2022-11-10 DIAGNOSIS — I10 HTN (HYPERTENSION), BENIGN: ICD-10-CM

## 2022-11-10 DIAGNOSIS — E53.8 VITAMIN B12 DEFICIENCY: ICD-10-CM

## 2022-11-10 DIAGNOSIS — Z00.00 NORMAL PHYSICAL EXAM, ROUTINE: Primary | ICD-10-CM

## 2022-11-10 PROBLEM — E66.812 CLASS 2 OBESITY DUE TO EXCESS CALORIES WITH BODY MASS INDEX (BMI) OF 38.0 TO 38.9 IN ADULT: Status: ACTIVE | Noted: 2022-11-10

## 2022-11-10 PROBLEM — N92.0 MENORRHAGIA WITH REGULAR CYCLE: Status: RESOLVED | Noted: 2020-05-12 | Resolved: 2022-11-10

## 2022-11-10 PROBLEM — D25.1 FIBROIDS, INTRAMURAL: Status: RESOLVED | Noted: 2020-05-27 | Resolved: 2022-11-10

## 2022-11-10 PROBLEM — G47.09 OTHER INSOMNIA: Status: RESOLVED | Noted: 2019-01-16 | Resolved: 2022-11-10

## 2022-11-10 LAB
ALBUMIN SERPL BCP-MCNC: 3.9 G/DL (ref 3.5–5.2)
ALP SERPL-CCNC: 71 U/L (ref 55–135)
ALT SERPL W/O P-5'-P-CCNC: 10 U/L (ref 10–44)
ANION GAP SERPL CALC-SCNC: 12 MMOL/L (ref 8–16)
AST SERPL-CCNC: 16 U/L (ref 10–40)
BASOPHILS # BLD AUTO: 0.02 K/UL (ref 0–0.2)
BASOPHILS NFR BLD: 0.5 % (ref 0–1.9)
BILIRUB SERPL-MCNC: 0.6 MG/DL (ref 0.1–1)
BUN SERPL-MCNC: 13 MG/DL (ref 6–20)
CALCIUM SERPL-MCNC: 9.6 MG/DL (ref 8.7–10.5)
CHLORIDE SERPL-SCNC: 102 MMOL/L (ref 95–110)
CHOLEST SERPL-MCNC: 182 MG/DL (ref 120–199)
CHOLEST/HDLC SERPL: 3.4 {RATIO} (ref 2–5)
CO2 SERPL-SCNC: 25 MMOL/L (ref 23–29)
CREAT SERPL-MCNC: 1 MG/DL (ref 0.5–1.4)
DIFFERENTIAL METHOD: ABNORMAL
EOSINOPHIL # BLD AUTO: 0.1 K/UL (ref 0–0.5)
EOSINOPHIL NFR BLD: 3.3 % (ref 0–8)
ERYTHROCYTE [DISTWIDTH] IN BLOOD BY AUTOMATED COUNT: 13.2 % (ref 11.5–14.5)
EST. GFR  (NO RACE VARIABLE): >60 ML/MIN/1.73 M^2
ESTIMATED AVG GLUCOSE: 103 MG/DL (ref 68–131)
GLUCOSE SERPL-MCNC: 75 MG/DL (ref 70–110)
HBA1C MFR BLD: 5.2 % (ref 4–5.6)
HCT VFR BLD AUTO: 41.9 % (ref 37–48.5)
HDLC SERPL-MCNC: 53 MG/DL (ref 40–75)
HDLC SERPL: 29.1 % (ref 20–50)
HGB BLD-MCNC: 13.3 G/DL (ref 12–16)
IMM GRANULOCYTES # BLD AUTO: 0 K/UL (ref 0–0.04)
IMM GRANULOCYTES NFR BLD AUTO: 0 % (ref 0–0.5)
LDLC SERPL CALC-MCNC: 118.6 MG/DL (ref 63–159)
LYMPHOCYTES # BLD AUTO: 1.5 K/UL (ref 1–4.8)
LYMPHOCYTES NFR BLD: 36.5 % (ref 18–48)
MCH RBC QN AUTO: 29.8 PG (ref 27–31)
MCHC RBC AUTO-ENTMCNC: 31.7 G/DL (ref 32–36)
MCV RBC AUTO: 94 FL (ref 82–98)
MONOCYTES # BLD AUTO: 0.4 K/UL (ref 0.3–1)
MONOCYTES NFR BLD: 9.1 % (ref 4–15)
NEUTROPHILS # BLD AUTO: 2.1 K/UL (ref 1.8–7.7)
NEUTROPHILS NFR BLD: 50.6 % (ref 38–73)
NONHDLC SERPL-MCNC: 129 MG/DL
NRBC BLD-RTO: 0 /100 WBC
PLATELET # BLD AUTO: 289 K/UL (ref 150–450)
PMV BLD AUTO: 12.1 FL (ref 9.2–12.9)
POTASSIUM SERPL-SCNC: 3.5 MMOL/L (ref 3.5–5.1)
PROT SERPL-MCNC: 7.5 G/DL (ref 6–8.4)
RBC # BLD AUTO: 4.47 M/UL (ref 4–5.4)
SODIUM SERPL-SCNC: 139 MMOL/L (ref 136–145)
T4 FREE SERPL-MCNC: 0.95 NG/DL (ref 0.71–1.51)
TRIGL SERPL-MCNC: 52 MG/DL (ref 30–150)
TSH SERPL DL<=0.005 MIU/L-ACNC: 1 UIU/ML (ref 0.4–4)
WBC # BLD AUTO: 4.19 K/UL (ref 3.9–12.7)

## 2022-11-10 PROCEDURE — 84439 ASSAY OF FREE THYROXINE: CPT | Performed by: INTERNAL MEDICINE

## 2022-11-10 PROCEDURE — 80061 LIPID PANEL: CPT | Performed by: INTERNAL MEDICINE

## 2022-11-10 PROCEDURE — 85025 COMPLETE CBC W/AUTO DIFF WBC: CPT | Performed by: INTERNAL MEDICINE

## 2022-11-10 PROCEDURE — 3008F PR BODY MASS INDEX (BMI) DOCUMENTED: ICD-10-PCS | Mod: CPTII,S$GLB,, | Performed by: INTERNAL MEDICINE

## 2022-11-10 PROCEDURE — 82652 VIT D 1 25-DIHYDROXY: CPT | Performed by: INTERNAL MEDICINE

## 2022-11-10 PROCEDURE — 1159F PR MEDICATION LIST DOCUMENTED IN MEDICAL RECORD: ICD-10-PCS | Mod: CPTII,S$GLB,, | Performed by: INTERNAL MEDICINE

## 2022-11-10 PROCEDURE — 99999 PR PBB SHADOW E&M-EST. PATIENT-LVL III: ICD-10-PCS | Mod: PBBFAC,,, | Performed by: INTERNAL MEDICINE

## 2022-11-10 PROCEDURE — 3008F BODY MASS INDEX DOCD: CPT | Mod: CPTII,S$GLB,, | Performed by: INTERNAL MEDICINE

## 2022-11-10 PROCEDURE — 1160F PR REVIEW ALL MEDS BY PRESCRIBER/CLIN PHARMACIST DOCUMENTED: ICD-10-PCS | Mod: CPTII,S$GLB,, | Performed by: INTERNAL MEDICINE

## 2022-11-10 PROCEDURE — 3074F SYST BP LT 130 MM HG: CPT | Mod: CPTII,S$GLB,, | Performed by: INTERNAL MEDICINE

## 2022-11-10 PROCEDURE — 99396 PR PREVENTIVE VISIT,EST,40-64: ICD-10-PCS | Mod: S$GLB,,, | Performed by: INTERNAL MEDICINE

## 2022-11-10 PROCEDURE — 80053 COMPREHEN METABOLIC PANEL: CPT | Performed by: INTERNAL MEDICINE

## 2022-11-10 PROCEDURE — 1160F RVW MEDS BY RX/DR IN RCRD: CPT | Mod: CPTII,S$GLB,, | Performed by: INTERNAL MEDICINE

## 2022-11-10 PROCEDURE — 3079F DIAST BP 80-89 MM HG: CPT | Mod: CPTII,S$GLB,, | Performed by: INTERNAL MEDICINE

## 2022-11-10 PROCEDURE — 1159F MED LIST DOCD IN RCRD: CPT | Mod: CPTII,S$GLB,, | Performed by: INTERNAL MEDICINE

## 2022-11-10 PROCEDURE — 3074F PR MOST RECENT SYSTOLIC BLOOD PRESSURE < 130 MM HG: ICD-10-PCS | Mod: CPTII,S$GLB,, | Performed by: INTERNAL MEDICINE

## 2022-11-10 PROCEDURE — 83036 HEMOGLOBIN GLYCOSYLATED A1C: CPT | Performed by: INTERNAL MEDICINE

## 2022-11-10 PROCEDURE — 99396 PREV VISIT EST AGE 40-64: CPT | Mod: S$GLB,,, | Performed by: INTERNAL MEDICINE

## 2022-11-10 PROCEDURE — 99999 PR PBB SHADOW E&M-EST. PATIENT-LVL III: CPT | Mod: PBBFAC,,, | Performed by: INTERNAL MEDICINE

## 2022-11-10 PROCEDURE — 84443 ASSAY THYROID STIM HORMONE: CPT | Performed by: INTERNAL MEDICINE

## 2022-11-10 PROCEDURE — 3079F PR MOST RECENT DIASTOLIC BLOOD PRESSURE 80-89 MM HG: ICD-10-PCS | Mod: CPTII,S$GLB,, | Performed by: INTERNAL MEDICINE

## 2022-11-10 RX ORDER — HYDROCHLOROTHIAZIDE 25 MG/1
25 TABLET ORAL DAILY
COMMUNITY
End: 2022-11-29 | Stop reason: SDUPTHER

## 2022-11-14 LAB — 1,25(OH)2D3 SERPL-MCNC: 24 PG/ML (ref 20–79)

## 2022-11-15 DIAGNOSIS — E55.9 VITAMIN D INSUFFICIENCY: Primary | ICD-10-CM

## 2022-11-15 RX ORDER — ERGOCALCIFEROL 1.25 MG/1
CAPSULE ORAL
Qty: 4 CAPSULE | Refills: 1 | Status: SHIPPED | OUTPATIENT
Start: 2022-11-15

## 2022-11-15 NOTE — PROGRESS NOTES
I sent pt a my chart message -  I reviewed your  labs.  Your Vitamin D was borderline low at 24.  Rec Ergocalciferol 50,000 units once weekly x 8 wks.  When complete you should start OTC Vit D 3 1000 units one daily. Your thyroid functions are normal.  Your Ha1c was normal at 5.2. Your Cholesterol looked good.  Your kidney function and liver functions looked good.  You are not anemic. No further recommendations at this time.    Dr. FAULKNER

## 2022-11-21 ENCOUNTER — HOSPITAL ENCOUNTER (OUTPATIENT)
Dept: RADIOLOGY | Facility: OTHER | Age: 42
Discharge: HOME | End: 2022-11-21
Attending: INTERNAL MEDICINE
Payer: COMMERCIAL

## 2022-11-21 DIAGNOSIS — E04.2 MULTIPLE THYROID NODULES: ICD-10-CM

## 2022-11-21 PROCEDURE — 76536 US EXAM OF HEAD AND NECK: CPT | Mod: 26,,, | Performed by: RADIOLOGY

## 2022-11-21 PROCEDURE — 76536 US EXAM OF HEAD AND NECK: CPT | Mod: TC

## 2022-11-21 PROCEDURE — 76536 US SOFT TISSUE HEAD NECK THYROID: ICD-10-PCS | Mod: 26,,, | Performed by: RADIOLOGY

## 2022-11-21 NOTE — PROGRESS NOTES
I sent pt a my chart message -  Good news! I reviewed your thyroid ultrasound which showed aStable cystic nodule on the left which does not require follow-up or FNA.  No new nodules.  Dr. FAULKNER

## 2022-11-28 ENCOUNTER — PATIENT MESSAGE (OUTPATIENT)
Dept: PRIMARY CARE CLINIC | Facility: CLINIC | Age: 42
End: 2022-11-28
Payer: COMMERCIAL

## 2022-11-28 DIAGNOSIS — I10 HTN (HYPERTENSION), BENIGN: Primary | ICD-10-CM

## 2022-11-29 ENCOUNTER — TELEPHONE (OUTPATIENT)
Dept: ENDOSCOPY | Facility: HOSPITAL | Age: 42
End: 2022-11-29
Payer: COMMERCIAL

## 2022-11-29 DIAGNOSIS — Z12.11 ENCOUNTER FOR SCREENING COLONOSCOPY: Primary | ICD-10-CM

## 2022-11-29 RX ORDER — HYDROCHLOROTHIAZIDE 25 MG/1
25 TABLET ORAL DAILY
Qty: 90 TABLET | Refills: 1 | Status: SHIPPED | OUTPATIENT
Start: 2022-11-29 | End: 2023-03-20 | Stop reason: SDUPTHER

## 2022-12-16 ENCOUNTER — PATIENT MESSAGE (OUTPATIENT)
Dept: PRIMARY CARE CLINIC | Facility: CLINIC | Age: 42
End: 2022-12-16
Payer: COMMERCIAL

## 2023-01-25 ENCOUNTER — PATIENT MESSAGE (OUTPATIENT)
Dept: ADMINISTRATIVE | Facility: HOSPITAL | Age: 43
End: 2023-01-25
Payer: COMMERCIAL

## 2023-02-13 PROBLEM — Z00.00 NORMAL PHYSICAL EXAM, ROUTINE: Status: RESOLVED | Noted: 2022-11-10 | Resolved: 2023-02-13

## 2023-03-14 ENCOUNTER — HOSPITAL ENCOUNTER (OUTPATIENT)
Dept: RADIOLOGY | Facility: OTHER | Age: 43
Discharge: HOME | End: 2023-03-14
Attending: FAMILY MEDICINE
Payer: COMMERCIAL

## 2023-03-14 DIAGNOSIS — Z12.31 ENCOUNTER FOR SCREENING MAMMOGRAM FOR MALIGNANT NEOPLASM OF BREAST: ICD-10-CM

## 2023-03-14 PROCEDURE — 77063 BREAST TOMOSYNTHESIS BI: CPT | Mod: 26,,, | Performed by: RADIOLOGY

## 2023-03-14 PROCEDURE — 77067 SCR MAMMO BI INCL CAD: CPT | Mod: 26,,, | Performed by: RADIOLOGY

## 2023-03-14 PROCEDURE — 77063 MAMMO DIGITAL SCREENING BILAT WITH TOMO: ICD-10-PCS | Mod: 26,,, | Performed by: RADIOLOGY

## 2023-03-14 PROCEDURE — 77067 SCR MAMMO BI INCL CAD: CPT | Mod: TC

## 2023-03-14 PROCEDURE — 77067 MAMMO DIGITAL SCREENING BILAT WITH TOMO: ICD-10-PCS | Mod: 26,,, | Performed by: RADIOLOGY

## 2023-03-20 ENCOUNTER — OFFICE VISIT (OUTPATIENT)
Dept: PRIMARY CARE CLINIC | Facility: CLINIC | Age: 43
End: 2023-03-20
Payer: COMMERCIAL

## 2023-03-20 VITALS
DIASTOLIC BLOOD PRESSURE: 86 MMHG | WEIGHT: 235.88 LBS | HEIGHT: 65 IN | SYSTOLIC BLOOD PRESSURE: 132 MMHG | BODY MASS INDEX: 39.3 KG/M2 | TEMPERATURE: 98 F

## 2023-03-20 DIAGNOSIS — I10 HTN (HYPERTENSION), BENIGN: ICD-10-CM

## 2023-03-20 DIAGNOSIS — E66.01 MORBID OBESITY DUE TO EXCESS CALORIES: ICD-10-CM

## 2023-03-20 DIAGNOSIS — I10 PRIMARY HYPERTENSION: Primary | ICD-10-CM

## 2023-03-20 DIAGNOSIS — I10 ESSENTIAL HYPERTENSION: ICD-10-CM

## 2023-03-20 DIAGNOSIS — R30.0 DYSURIA: ICD-10-CM

## 2023-03-20 LAB
BILIRUB UR QL STRIP: NEGATIVE
CLARITY UR REFRACT.AUTO: CLEAR
COLOR UR AUTO: YELLOW
GLUCOSE UR QL STRIP: NEGATIVE
HGB UR QL STRIP: NEGATIVE
KETONES UR QL STRIP: NEGATIVE
LEUKOCYTE ESTERASE UR QL STRIP: NEGATIVE
NITRITE UR QL STRIP: NEGATIVE
PH UR STRIP: 6 [PH] (ref 5–8)
PROT UR QL STRIP: NEGATIVE
SP GR UR STRIP: 1.01 (ref 1–1.03)
URN SPEC COLLECT METH UR: NORMAL

## 2023-03-20 PROCEDURE — 99999 PR PBB SHADOW E&M-EST. PATIENT-LVL III: ICD-10-PCS | Mod: PBBFAC,,, | Performed by: FAMILY MEDICINE

## 2023-03-20 PROCEDURE — 3008F BODY MASS INDEX DOCD: CPT | Mod: CPTII,S$GLB,, | Performed by: FAMILY MEDICINE

## 2023-03-20 PROCEDURE — 99214 OFFICE O/P EST MOD 30 MIN: CPT | Mod: S$GLB,,, | Performed by: FAMILY MEDICINE

## 2023-03-20 PROCEDURE — 99214 PR OFFICE/OUTPT VISIT, EST, LEVL IV, 30-39 MIN: ICD-10-PCS | Mod: S$GLB,,, | Performed by: FAMILY MEDICINE

## 2023-03-20 PROCEDURE — 1160F RVW MEDS BY RX/DR IN RCRD: CPT | Mod: CPTII,S$GLB,, | Performed by: FAMILY MEDICINE

## 2023-03-20 PROCEDURE — 3008F PR BODY MASS INDEX (BMI) DOCUMENTED: ICD-10-PCS | Mod: CPTII,S$GLB,, | Performed by: FAMILY MEDICINE

## 2023-03-20 PROCEDURE — 1159F PR MEDICATION LIST DOCUMENTED IN MEDICAL RECORD: ICD-10-PCS | Mod: CPTII,S$GLB,, | Performed by: FAMILY MEDICINE

## 2023-03-20 PROCEDURE — 81003 URINALYSIS AUTO W/O SCOPE: CPT | Performed by: FAMILY MEDICINE

## 2023-03-20 PROCEDURE — 3079F PR MOST RECENT DIASTOLIC BLOOD PRESSURE 80-89 MM HG: ICD-10-PCS | Mod: CPTII,S$GLB,, | Performed by: FAMILY MEDICINE

## 2023-03-20 PROCEDURE — 3075F SYST BP GE 130 - 139MM HG: CPT | Mod: CPTII,S$GLB,, | Performed by: FAMILY MEDICINE

## 2023-03-20 PROCEDURE — 99999 PR PBB SHADOW E&M-EST. PATIENT-LVL III: CPT | Mod: PBBFAC,,, | Performed by: FAMILY MEDICINE

## 2023-03-20 PROCEDURE — 3075F PR MOST RECENT SYSTOLIC BLOOD PRESS GE 130-139MM HG: ICD-10-PCS | Mod: CPTII,S$GLB,, | Performed by: FAMILY MEDICINE

## 2023-03-20 PROCEDURE — 3079F DIAST BP 80-89 MM HG: CPT | Mod: CPTII,S$GLB,, | Performed by: FAMILY MEDICINE

## 2023-03-20 PROCEDURE — 1160F PR REVIEW ALL MEDS BY PRESCRIBER/CLIN PHARMACIST DOCUMENTED: ICD-10-PCS | Mod: CPTII,S$GLB,, | Performed by: FAMILY MEDICINE

## 2023-03-20 PROCEDURE — 1159F MED LIST DOCD IN RCRD: CPT | Mod: CPTII,S$GLB,, | Performed by: FAMILY MEDICINE

## 2023-03-20 RX ORDER — AMLODIPINE BESYLATE 10 MG/1
10 TABLET ORAL DAILY
COMMUNITY
End: 2023-03-20 | Stop reason: SDUPTHER

## 2023-03-20 RX ORDER — AMLODIPINE BESYLATE 10 MG/1
10 TABLET ORAL DAILY
Qty: 90 TABLET | Refills: 3 | Status: SHIPPED | OUTPATIENT
Start: 2023-03-20 | End: 2023-11-27

## 2023-03-20 RX ORDER — HYDROCHLOROTHIAZIDE 25 MG/1
25 TABLET ORAL DAILY
Qty: 90 TABLET | Refills: 3 | Status: SHIPPED | OUTPATIENT
Start: 2023-03-20 | End: 2023-11-27 | Stop reason: SDUPTHER

## 2023-03-20 NOTE — PROGRESS NOTES
"    /86 (BP Location: Right arm, Patient Position: Sitting)   Temp 98.1 °F (36.7 °C)   Ht 5' 5" (1.651 m)   Wt 107 kg (235 lb 14.3 oz)   LMP 07/25/2020 (Exact Date) Comment: 7/2020  BMI 39.25 kg/m²       ===========    Chief Complaint: No chief complaint on file.        Akilah Parker is a 42 y.o. female here for    HPI  Patient arrived via check in at 8:05 a.m. for 15 minute office visit.  At 8:16 a.m., staff came to tell me that she went into the room to room the patient with vital signs and appropriate grooming and patient remained on the phone the entire time     Notified staff to proceed with rooming the next patients and I would fit this patient in after patients who arrived on time.        Here to establish care.  had annual wellness exam in November 2022 with another provider.    Has essential hypertension.  Morbid obesity.    Reports some recent dysuria.  No fevers chills.  No flank pain.    Patient queried and denies any further complaints    SURGICAL AND MEDICAL HISTORY: updated and reviewed.  ALLERGIES updated and reviewed.  Review of patient's allergies indicates:  No Known Allergies  CURRENT OUTPATIENT MEDICATIONS updated and reviewed    Current Outpatient Medications:     ergocalciferol (ERGOCALCIFEROL) 50,000 unit Cap, 1 po weekly x 8 wks, Disp: 4 capsule, Rfl: 1    amLODIPine (NORVASC) 10 MG tablet, Take 1 tablet (10 mg total) by mouth once daily., Disp: 90 tablet, Rfl: 3    hydroCHLOROthiazide (HYDRODIURIL) 25 MG tablet, Take 1 tablet (25 mg total) by mouth once daily., Disp: 90 tablet, Rfl: 3    omeprazole (PRILOSEC) 40 MG capsule, Take 1 capsule (40 mg total) by mouth before breakfast. Take one pill every morning 45 minutes before breakfast, Disp: 90 capsule, Rfl: 1    Review of Systems   Constitutional:  Negative for activity change, appetite change, chills, diaphoresis, fatigue, fever and unexpected weight change.   HENT:  Negative for congestion, ear discharge, ear pain, facial " "swelling, hearing loss, nosebleeds, postnasal drip, rhinorrhea, sinus pressure, sneezing, sore throat, tinnitus, trouble swallowing and voice change.    Eyes:  Negative for photophobia, pain, discharge, redness, itching and visual disturbance.   Respiratory:  Negative for cough, chest tightness, shortness of breath and wheezing.    Cardiovascular:  Negative for chest pain, palpitations and leg swelling.   Gastrointestinal:  Negative for abdominal distention, abdominal pain, anal bleeding, blood in stool, constipation, diarrhea, nausea, rectal pain and vomiting.   Endocrine: Negative for cold intolerance, heat intolerance, polydipsia, polyphagia and polyuria.   Genitourinary:  Positive for dysuria. Negative for difficulty urinating and flank pain.   Musculoskeletal:  Negative for arthralgias, back pain, joint swelling, myalgias and neck pain.   Skin:  Negative for rash.   Neurological:  Negative for dizziness, tremors, seizures, syncope, speech difficulty, weakness, light-headedness, numbness and headaches.   Psychiatric/Behavioral:  Negative for behavioral problems, confusion, decreased concentration, dysphoric mood, sleep disturbance and suicidal ideas. The patient is not nervous/anxious and is not hyperactive.      /86 (BP Location: Right arm, Patient Position: Sitting)   Temp 98.1 °F (36.7 °C)   Ht 5' 5" (1.651 m)   Wt 107 kg (235 lb 14.3 oz)   LMP 07/25/2020 (Exact Date) Comment: 7/2020  BMI 39.25 kg/m²   Physical Exam  Vitals and nursing note reviewed.   Constitutional:       General: She is not in acute distress.     Appearance: Normal appearance. She is well-developed. She is not ill-appearing or toxic-appearing.   HENT:      Head: Normocephalic and atraumatic.      Right Ear: Tympanic membrane, ear canal and external ear normal.      Left Ear: Tympanic membrane, ear canal and external ear normal.      Nose: Nose normal.      Mouth/Throat:      Lips: Pink.      Mouth: Mucous membranes are moist.    "   Pharynx: No oropharyngeal exudate or posterior oropharyngeal erythema.   Eyes:      General: No scleral icterus.        Right eye: No discharge.         Left eye: No discharge.      Extraocular Movements: Extraocular movements intact.      Conjunctiva/sclera: Conjunctivae normal.   Cardiovascular:      Rate and Rhythm: Normal rate and regular rhythm.      Pulses: Normal pulses.      Heart sounds: Normal heart sounds. No murmur heard.  Pulmonary:      Effort: Pulmonary effort is normal. No respiratory distress.      Breath sounds: Normal breath sounds. No wheezing or rales.   Abdominal:      General: Bowel sounds are normal. There is no distension.      Palpations: Abdomen is soft. There is no mass.      Tenderness: There is no abdominal tenderness. There is no right CVA tenderness, left CVA tenderness, guarding or rebound.      Hernia: No hernia is present.   Musculoskeletal:      Cervical back: Normal range of motion and neck supple. No rigidity or tenderness.   Lymphadenopathy:      Cervical: No cervical adenopathy.   Skin:     General: Skin is warm and dry.   Neurological:      General: No focal deficit present.      Mental Status: She is alert. Mental status is at baseline.   Psychiatric:         Mood and Affect: Mood normal.         Behavior: Behavior normal. Behavior is cooperative.       Diagnoses and all orders for this visit:    Primary hypertension  -     hydroCHLOROthiazide (HYDRODIURIL) 25 MG tablet; Take 1 tablet (25 mg total) by mouth once daily.  -     amLODIPine (NORVASC) 10 MG tablet; Take 1 tablet (10 mg total) by mouth once daily.    Morbid obesity due to excess calories    Dysuria  -     Urinalysis, Reflex to Urine Culture Urine, Clean Catch    HTN (hypertension), benign    Essential hypertension          Check urinalysis.  Continue meds as above.  Blood pressure needs better control.  Low-sodium Mediterranean diet.  Restrict calories and exercise for weight loss.  Recommend follow-up 6 months  to look at blood pressure again.    All lab results over past 2 years available reviewed inc labs and any cardiology or radiology studies  Any new prescription medications gone over in detail including reason for taking the medication, the general mechanism of action, most common possible side effects and possible costs, etcetera.  Francisco Yoo MD

## 2023-03-26 PROBLEM — E66.812 CLASS 2 OBESITY DUE TO EXCESS CALORIES WITH BODY MASS INDEX (BMI) OF 38.0 TO 38.9 IN ADULT: Status: RESOLVED | Noted: 2022-11-10 | Resolved: 2023-03-26

## 2023-03-26 PROBLEM — E66.09 CLASS 2 OBESITY DUE TO EXCESS CALORIES WITH BODY MASS INDEX (BMI) OF 38.0 TO 38.9 IN ADULT: Status: RESOLVED | Noted: 2022-11-10 | Resolved: 2023-03-26

## 2023-03-26 PROBLEM — E66.01 MORBID OBESITY DUE TO EXCESS CALORIES: Status: ACTIVE | Noted: 2023-03-26

## 2023-04-21 ENCOUNTER — TELEPHONE (OUTPATIENT)
Dept: ORTHOPEDICS | Facility: CLINIC | Age: 43
End: 2023-04-21
Payer: COMMERCIAL

## 2023-04-21 DIAGNOSIS — M50.30 DDD (DEGENERATIVE DISC DISEASE), CERVICAL: Primary | ICD-10-CM

## 2023-04-24 ENCOUNTER — HOSPITAL ENCOUNTER (OUTPATIENT)
Dept: RADIOLOGY | Facility: HOSPITAL | Age: 43
Discharge: HOME | End: 2023-04-24
Attending: ORTHOPAEDIC SURGERY
Payer: COMMERCIAL

## 2023-04-24 ENCOUNTER — OFFICE VISIT (OUTPATIENT)
Dept: ORTHOPEDICS | Facility: CLINIC | Age: 43
End: 2023-04-24
Payer: COMMERCIAL

## 2023-04-24 VITALS — WEIGHT: 235.44 LBS | BODY MASS INDEX: 37.84 KG/M2 | HEIGHT: 66 IN

## 2023-04-24 DIAGNOSIS — M50.30 DDD (DEGENERATIVE DISC DISEASE), CERVICAL: ICD-10-CM

## 2023-04-24 DIAGNOSIS — M50.30 DDD (DEGENERATIVE DISC DISEASE), CERVICAL: Primary | ICD-10-CM

## 2023-04-24 PROCEDURE — 1160F PR REVIEW ALL MEDS BY PRESCRIBER/CLIN PHARMACIST DOCUMENTED: ICD-10-PCS | Mod: CPTII,S$GLB,, | Performed by: ORTHOPAEDIC SURGERY

## 2023-04-24 PROCEDURE — 99204 PR OFFICE/OUTPT VISIT, NEW, LEVL IV, 45-59 MIN: ICD-10-PCS | Mod: S$GLB,,, | Performed by: ORTHOPAEDIC SURGERY

## 2023-04-24 PROCEDURE — 99204 OFFICE O/P NEW MOD 45 MIN: CPT | Mod: S$GLB,,, | Performed by: ORTHOPAEDIC SURGERY

## 2023-04-24 PROCEDURE — 72050 X-RAY EXAM NECK SPINE 4/5VWS: CPT | Mod: TC

## 2023-04-24 PROCEDURE — 1159F PR MEDICATION LIST DOCUMENTED IN MEDICAL RECORD: ICD-10-PCS | Mod: CPTII,S$GLB,, | Performed by: ORTHOPAEDIC SURGERY

## 2023-04-24 PROCEDURE — 72050 XR CERVICAL SPINE AP LAT WITH FLEX EXTEN: ICD-10-PCS | Mod: 26,,, | Performed by: RADIOLOGY

## 2023-04-24 PROCEDURE — 99999 PR PBB SHADOW E&M-EST. PATIENT-LVL III: ICD-10-PCS | Mod: PBBFAC,,, | Performed by: ORTHOPAEDIC SURGERY

## 2023-04-24 PROCEDURE — 72050 X-RAY EXAM NECK SPINE 4/5VWS: CPT | Mod: 26,,, | Performed by: RADIOLOGY

## 2023-04-24 PROCEDURE — 1159F MED LIST DOCD IN RCRD: CPT | Mod: CPTII,S$GLB,, | Performed by: ORTHOPAEDIC SURGERY

## 2023-04-24 PROCEDURE — 3008F PR BODY MASS INDEX (BMI) DOCUMENTED: ICD-10-PCS | Mod: CPTII,S$GLB,, | Performed by: ORTHOPAEDIC SURGERY

## 2023-04-24 PROCEDURE — 1160F RVW MEDS BY RX/DR IN RCRD: CPT | Mod: CPTII,S$GLB,, | Performed by: ORTHOPAEDIC SURGERY

## 2023-04-24 PROCEDURE — 99999 PR PBB SHADOW E&M-EST. PATIENT-LVL III: CPT | Mod: PBBFAC,,, | Performed by: ORTHOPAEDIC SURGERY

## 2023-04-24 PROCEDURE — 3008F BODY MASS INDEX DOCD: CPT | Mod: CPTII,S$GLB,, | Performed by: ORTHOPAEDIC SURGERY

## 2023-04-24 RX ORDER — CYCLOBENZAPRINE HCL 5 MG
5 TABLET ORAL 3 TIMES DAILY PRN
Qty: 60 TABLET | Refills: 1 | Status: SHIPPED | OUTPATIENT
Start: 2023-04-24 | End: 2023-11-27

## 2023-04-24 RX ORDER — MELOXICAM 15 MG/1
15 TABLET ORAL DAILY
Qty: 60 TABLET | Refills: 1 | Status: SHIPPED | OUTPATIENT
Start: 2023-04-24 | End: 2023-11-27

## 2023-04-24 NOTE — PROGRESS NOTES
DATE: 4/24/2023  PATIENT: Akilah Parker    Attending Physician: Esteban Kaiser M.D.    CHIEF COMPLAINT: neck pain    HISTORY:  Akilah Parker is a 42 y.o. female  presents for initial evaluation of neck pain (Neck - 3). The pain has been present for the past few weeks. The patient describes the pain as aching. The pain is worse at the end of the day and improved by rest. There is no associated numbness and tingling. There is subjective weakness in both shoulders. Prior treatments have included rest, but no medications, therapy, COLLEEN, surgery.     The Patient denies myelopathic symptoms such as handwriting changes or difficulty with buttons/coins/keys. Denies perineal paresthesias, bowel/bladder dysfunction.    The patient does not smoke, have DM or endorse IVDU. The patient is not on any blood thinners and does not take chronic narcotics. She works as a home care nurse.    PAST MEDICAL/SURGICAL HISTORY:  Past Medical History:   Diagnosis Date    Acne vulgaris     Fibroids, intramural 05/27/2020    Ganglion cyst     bilateral     GERD (gastroesophageal reflux disease)     History of kidney stones 2012    Hypertension     IN PAST    CHARISSE (iron deficiency anemia)     Menorrhagia with regular cycle 05/12/2020    Nephrolithiasis     PONV (postoperative nausea and vomiting)     Thyroid nodule 2014    Scattered micro nodules, stable in size compared to 2014     Past Surgical History:   Procedure Laterality Date    LAPAROSCOPIC TOTAL HYSTERECTOMY N/A 8/5/2020    Procedure: HYSTERECTOMY, TOTAL, LAPAROSCOPIC;  Surgeon: Claudio Oliva MD;  Location: [facility] OR;  Service: OB/GYN;  Laterality: N/A;  PRE-OP BY RN 7-    ORIF FOOT FRACTURE      right ankle    OTHER SURGICAL HISTORY      retrieved kidney stone without lithotripsy    SALPINGECTOMY Bilateral 8/5/2020    Procedure: SALPINGECTOMY;  Surgeon: Claudio Oliva MD;  Location: [facility] OR;  Service: OB/GYN;  Laterality: Bilateral;    TONSILLECTOMY, ADENOIDECTOMY Bilateral  11/17/2021    Procedure: TONSILLECTOMY AND ADENOIDECTOMY;  Surgeon: Delano Henning MD;  Location: [facility];  Service: ENT;  Laterality: Bilateral;    TUBAL LIGATION         Current Medications:   Current Outpatient Medications:     amLODIPine (NORVASC) 10 MG tablet, Take 1 tablet (10 mg total) by mouth once daily., Disp: 90 tablet, Rfl: 3    ergocalciferol (ERGOCALCIFEROL) 50,000 unit Cap, 1 po weekly x 8 wks, Disp: 4 capsule, Rfl: 1    hydroCHLOROthiazide (HYDRODIURIL) 25 MG tablet, Take 1 tablet (25 mg total) by mouth once daily., Disp: 90 tablet, Rfl: 3    cyclobenzaprine (FLEXERIL) 5 MG tablet, Take 1 tablet (5 mg total) by mouth 3 (three) times daily as needed for Muscle spasms., Disp: 60 tablet, Rfl: 1    meloxicam (MOBIC) 15 MG tablet, Take 1 tablet (15 mg total) by mouth once daily., Disp: 60 tablet, Rfl: 1    omeprazole (PRILOSEC) 40 MG capsule, Take 1 capsule (40 mg total) by mouth before breakfast. Take one pill every morning 45 minutes before breakfast, Disp: 90 capsule, Rfl: 1    Social History:   Social History     Socioeconomic History    Marital status: Single   Occupational History    Occupation: RN     Employer: [employer]   Tobacco Use    Smoking status: Never    Smokeless tobacco: Never   Substance and Sexual Activity    Alcohol use: Yes     Comment: occassionally    Drug use: No    Sexual activity: Yes     Partners: Male     Birth control/protection: None, Surgical   Other Topics Concern    Are you pregnant or think you may be? 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 Negative for chills, fever and night sweats.   Cardiovascular: Negative for chest pain and syncope.   Respiratory: Negative for cough and shortness of breath.   Gastrointestinal: See HPI. Negative for nausea/vomiting. Negative for abdominal pain.  Genitourinary: See HPI. Negative for  "discoloration or dysuria.  Skin: Negative for dry skin, itching and rash.   Hematologic/Lymphatic:neg for bleeding/clotting disorders.   Musculoskeletal: Negative for falls and muscle weakness.   Neurological: See HPI. no history of seizures. no history of cranial surgery or shunts.  Endocrine: Negative for polydipsia, polyphagia and polyuria.   Allergic/Immunologic: Negative for hives and persistent infections.  Psychiatric/Behavioral: Negative for depression and insomnia.         EXAM:  Ht 5' 6" (1.676 m)   Wt 106.8 kg (235 lb 7.2 oz)   LMP 07/25/2020 (Exact Date) Comment: 7/2020  BMI 38.00 kg/m²     General: The patient is a 42 y.o. female in no apparent distress, the patient is orientatied to person, place and time.  Psych: Normal mood and affect  HEENT: Vision grossly intact, hearing intact to the spoken word.  Lungs: Respirations unlabored.  Gait: Normal station and gait, no difficulty with toe or heel walk.   Skin: Cervical skin negative for rashes, lesions, hairy patches and surgical scars.  Range of motion: Cervical range of motion is acceptable. There is mild posterior neck tenderness to palpation.  Spinal Balance: Global saggital and coronal spinal balance acceptable, no significant for scoliosis and kyphosis.  Musculoskeletal: No pain with the range of motion of the bilateral shoulders and elbows. Normal bulk and contour of the bilateral hands.  Vascular: Bilateral hands warm and well perfused, Radial pulses 2+ bilaterally.  Neurological: Normal strength and tone in all major motor groups in the bilateral upper and lower extremities. Normal sensation to light touch in the C5-T1 and L2-S1 dermatomes bilaterally.  Deep tendon reflexes symmetric in the bilateral upper and lower extremities.  Negative Inverted Radial Reflex and Winn's bilaterally. Negative Babinski bilaterally.     IMAGING:   Today I independently reviewed the following images and my interpretations are as follows:    AP, Lat and " Flex/Ex  upright C-spine films demonstrate mild DDD without listhesis.    Body mass index is 38 kg/m².  Hemoglobin A1C   Date Value Ref Range Status   11/10/2022 5.2 4.0 - 5.6 % Final     Comment:     ADA Screening Guidelines:  5.7-6.4%  Consistent with prediabetes  >or=6.5%  Consistent with diabetes    High levels of fetal hemoglobin interfere with the HbA1C  assay. Heterozygous hemoglobin variants (HbS, HgC, etc)do  not significantly interfere with this assay.   However, presence of multiple variants may affect accuracy.     07/26/2021 5.2 4.0 - 5.6 % Final     Comment:     ADA Screening Guidelines:  5.7-6.4%  Consistent with prediabetes  >or=6.5%  Consistent with diabetes    High levels of fetal hemoglobin interfere with the HbA1C  assay. Heterozygous hemoglobin variants (HbS, HgC, etc)do  not significantly interfere with this assay.   However, presence of multiple variants may affect accuracy.     07/27/2020 5.3 4.0 - 5.6 % Final     Comment:     ADA Screening Guidelines:  5.7-6.4%  Consistent with prediabetes  >or=6.5%  Consistent with diabetes  High levels of fetal hemoglobin interfere with the HbA1C  assay. Heterozygous hemoglobin variants (HbS, HgC, etc)do  not significantly interfere with this assay.   However, presence of multiple variants may affect accuracy.         ASSESSMENT/PLAN:  DDD (degenerative disc disease), cervical  -     Ambulatory referral/consult to Physical/Occupational Therapy; Future; Expected date: 05/01/2023  -     meloxicam (MOBIC) 15 MG tablet; Take 1 tablet (15 mg total) by mouth once daily.  Dispense: 60 tablet; Refill: 1  -     cyclobenzaprine (FLEXERIL) 5 MG tablet; Take 1 tablet (5 mg total) by mouth 3 (three) times daily as needed for Muscle spasms.  Dispense: 60 tablet; Refill: 1      Follow up if symptoms worsen or fail to improve.    Patient has cervical DDD. I discussed the natural history of their diagnoses as well as surgical and nonsurgical treatment options. I educated  the patient on the importance of core/back strengthening, correct posture, bending/lifting ergonomics, and low-impact aerobic exercises (walking, elliptical, and aquatherapy). I prescribed mobic and flexeril. I will refer the patient to PT for neck ROM. Patient will follow up PRN. Next step is a cervical MRI.    I have personally examined the patient and agree with the above plan.    Esteban Kaiser MD  Orthopaedic Spine Surgeon  Department of Orthopaedic Surgery  [phone]

## 2023-07-12 ENCOUNTER — OFFICE VISIT (OUTPATIENT)
Dept: DERMATOLOGY | Facility: CLINIC | Age: 43
End: 2023-07-12
Payer: COMMERCIAL

## 2023-07-12 DIAGNOSIS — L81.9 HYPERPIGMENTATION: ICD-10-CM

## 2023-07-12 DIAGNOSIS — L70.9 ACNE, UNSPECIFIED ACNE TYPE: Primary | ICD-10-CM

## 2023-07-12 DIAGNOSIS — L68.0 HIRSUTISM: ICD-10-CM

## 2023-07-12 DIAGNOSIS — L85.8 KP (KERATOSIS PILARIS): ICD-10-CM

## 2023-07-12 DIAGNOSIS — Z76.89 ENCOUNTER FOR SKIN CARE: ICD-10-CM

## 2023-07-12 DIAGNOSIS — L91.8 SKIN TAG: ICD-10-CM

## 2023-07-12 DIAGNOSIS — R23.4 OILY SKIN: ICD-10-CM

## 2023-07-12 PROCEDURE — 99999 PR PBB SHADOW E&M-EST. PATIENT-LVL III: CPT | Mod: PBBFAC,,, | Performed by: DERMATOLOGY

## 2023-07-12 PROCEDURE — 99204 OFFICE O/P NEW MOD 45 MIN: CPT | Mod: S$GLB,,, | Performed by: DERMATOLOGY

## 2023-07-12 PROCEDURE — 99204 PR OFFICE/OUTPT VISIT, NEW, LEVL IV, 45-59 MIN: ICD-10-PCS | Mod: S$GLB,,, | Performed by: DERMATOLOGY

## 2023-07-12 PROCEDURE — 1159F MED LIST DOCD IN RCRD: CPT | Mod: CPTII,S$GLB,, | Performed by: DERMATOLOGY

## 2023-07-12 PROCEDURE — 99999 PR PBB SHADOW E&M-EST. PATIENT-LVL III: ICD-10-PCS | Mod: PBBFAC,,, | Performed by: DERMATOLOGY

## 2023-07-12 PROCEDURE — 1159F PR MEDICATION LIST DOCUMENTED IN MEDICAL RECORD: ICD-10-PCS | Mod: CPTII,S$GLB,, | Performed by: DERMATOLOGY

## 2023-07-12 RX ORDER — TRETINOIN 0.5 MG/G
CREAM TOPICAL NIGHTLY
Qty: 20 G | Refills: 1 | Status: SHIPPED | OUTPATIENT
Start: 2023-07-12

## 2023-07-12 NOTE — PROGRESS NOTES
Subjective:      Patient ID:  Akilah Parker is a 42 y.o. female who presents for   Chief Complaint   Patient presents with    Acne     Face, chest    Skin Tags     R armpit    Nail Problem     R big toe     Acne - Initial  Affected locations: face, neck and chest  Duration: 20 years  Signs / symptoms: dryness (oily)  Severity: mild to moderate  Timing: recurrent and constant  Aggravated by: heat (Weraing a mask)  Treatments tried: Malissa products, Elta 40, La Roche Posay products.    Skin Tags - Initial  Affected locations: right axilla  Duration: Pt. stated a couple of years.  Signs / symptoms: irritated and pain  Severity: mild to moderate  Timing: constant  Aggravated by: friction  Relieving factors/Treatments tried: OTC hydrocortisone  Improvement on treatment: mild    Nail Problem - Initial  Affected locations: right toes  Duration: 1 year  Signs / symptoms: itching (Discoloration)  Severity: mild  Timing: constant  Relieving factors/Treatments tried: nothing      Review of Systems   Constitutional:  Negative for fever and chills.   HENT:  Negative for sore throat.    Respiratory:  Negative for cough.      Objective:   Physical Exam   Constitutional: She appears well-developed and well-nourished.   Neurological: She is alert and oriented to person, place, and time.   Psychiatric: She has a normal mood and affect.   Skin:                      Diagram Legend     Erythematous scaling macule/papule c/w actinic keratosis       Vascular papule c/w angioma      Pigmented verrucoid papule/plaque c/w seborrheic keratosis      Yellow umbilicated papule c/w sebaceous hyperplasia      Irregularly shaped tan macule c/w lentigo     1-2 mm smooth white papules consistent with Milia      Movable subcutaneous cyst with punctum c/w epidermal inclusion cyst      Subcutaneous movable cyst c/w pilar cyst      Firm pink to brown papule c/w dermatofibroma      Pedunculated fleshy papule(s) c/w skin tag(s)      Evenly pigmented  macule c/w junctional nevus     Mildly variegated pigmented, slightly irregular-bordered macule c/w mildly atypical nevus      Flesh colored to evenly pigmented papule c/w intradermal nevus       Pink pearly papule/plaque c/w basal cell carcinoma      Erythematous hyperkeratotic cursted plaque c/w SCC      Surgical scar with no sign of skin cancer recurrence      Open and closed comedones      Inflammatory papules and pustules      Verrucoid papule consistent consistent with wart     Erythematous eczematous patches and plaques     Dystrophic onycholytic nail with subungual debris c/w onychomycosis     Umbilicated papule    Erythematous-base heme-crusted tan verrucoid plaque consistent with inflamed seborrheic keratosis     Erythematous Silvery Scaling Plaque c/w Psoriasis     See annotation        Assessment / Plan:        Acne, unspecified acne type  -     tretinoin (RETIN-A) 0.05 % cream; Apply topically every evening. Start with every other night and move up to nightly after 2 weeks if not too dry.  face  Dispense: 20 g; Refill: 1  Previous [facility] labs and or records and notes reviewed and considered for their impact on our clinical decision making today.  Long term and slow treatment treatment required for acne discussed today.  Gentle skin care with avoidance of hot water and usage of oil free products discussed.  Avoidance of lotions and make up discussed in addition to all other products.  Oil free sun block if needed.  Try to use clothing and hats to avoid extra products occluding the pores.  Compliance with prescribed regimen discussed.  No picking or squeezing of acne lesions discussed.  Focal coconut oil or jojoba oil as needed for dry areas.  Hold acne topicals if skin is getting too dry.  Resume when ready.  Reviewed with patient different treatment options and associated risks.  Instructed patient to use mild soaps like glycerin bar soap for washing the face and body.  Can also consider avoiding soap  on body except for armpits, groin, and soles if suffering from eczema.    Hirsutism  Consider laser hair removal.  Discussed multiple sessions required with realistic goal of 60% decreased density over a period of about a year or more.  Recommended to seek services at [facility] in [facility] with Dr. Grissom or with board certified dermatologist or plastic surgeon.    Reviewed options for laser hair removal, potential amounts of decreased hair density, potential number of needed sessions, suitable offices with board certified dermatologists and or plastic surgeons to provide such services.  Also discussed electrolysis and how it works.  Offered Vaniqa.  Can try saw palmetto 500 mg to 1000 mg per day.  Prefer organic version.    Oily skin  -     tretinoin (RETIN-A) 0.05 % cream; Apply topically every evening. Start with every other night and move up to nightly after 2 weeks if not too dry.  face  Dispense: 20 g; Refill: 1  Patient to start using sulfur bar soap for the face or affected area 1-2 x day.  For scalp usage lather from the soap to be applied to the roots of the scalp and allow to sit for 3-5 minutes regularly.  Same instructions for other affected areas.  Proper application of medications and or care for affected area(s) and condition(s) reviewed.    KP (keratosis pilaris)  Etiology of keratosis pilaris discussed and explained the dry skin plugging of the pores.  Recommended salicylic acid washes for now with avoidance of excessively hot water bathing on affected areas.  Use a scrub brush and a loofa.  Can consider moisturizers and natural oils later.  Consider gluten free diet as it may help.  Chest.    Hyperpigmentation  -     tretinoin (RETIN-A) 0.05 % cream; Apply topically every evening. Start with every other night and move up to nightly after 2 weeks if not too dry.  face  Dispense: 20 g; Refill: 1  Wait on bleaching for now.    Encounter for skin care  Patient instructed in importance in  daily sun protection. Sun avoidance and topical protection discussed.     Patient encouraged to wear hat for all outdoor exposure.     Also discussed sun protective clothing.  Stop all applicable skin care and hair products, chemicals, perfumes, and treatments to affected areas.  No deodorants and antiperspirants, if applicable.  No store bought lotions or potions.       Skin tag  Discussed with patient the benign nature of these lesions and that no treatment is indicated.  Patient can also consider folk treatment of string strangulation at home.  20-40% salicylic acid can be used with occlusion for tags/warts daily with breaks as needed for discomfort or sensitivity.  This can be done on a protracted basis for clearance over time.   Prn cosmetic hyfrecation of 2tags on the r pit.  Costs $150.  Scar and recurrence reviewed.  Pt wants cos hyf.             Follow up in about 3 months (around 10/12/2023).

## 2023-07-12 NOTE — PATIENT INSTRUCTIONS
No hot water on the face reviewed.    Long term and slow treatment treatment required for acne discussed today.  Gentle skin care with avoidance of hot water and usage of oil free products discussed.  Avoidance of lotions and make up discussed in addition to all other products.  Oil free sun block if needed.  Try to use clothing and hats to avoid extra products occluding the pores.  Compliance with prescribed regimen discussed.  No picking or squeezing of acne lesions discussed.  Focal coconut oil or jojoba oil as needed for dry areas.  Hold acne topicals if skin is getting too dry.  Resume when ready.    Patient to start using sulfur bar soap for the face or affected area 1-2 x day.  For scalp usage lather from the soap to be applied to the roots of the scalp and allow to sit for 3-5 minutes regularly.  Same instructions for other affected areas.    Can try saw palmetto 500 mg to 1000 mg per day.  Prefer organic version.    Use an electric shaver.    Patient instructed in importance in daily sun protection. Sun avoidance and topical protection discussed.     Patient encouraged to wear hat for all outdoor exposure.     Also discussed sun protective clothing.    Etiology of keratosis pilaris discussed and explained the dry skin plugging of the pores.  Recommended salicylic acid washes for now with avoidance of excessively hot water bathing on affected areas.  Use a scrub brush and a loofa.  Can consider moisturizers and natural oils later.  Consider gluten free diet as it may help.    Diet with increased fiber, avoidance of sugars, avoidance of dairy, avoidance of wheat and white flour (gluten) all discussed.  Avoid caffeine and alcohol and energy drinks.  Look up Regan Randle's anti inflammatory diet.  Don't worry about extraneous details, like avoidance of tomatoes and mushrooms.  No alcohol, smoking, vaping, tobacco products, illicit drugs.

## 2023-07-26 ENCOUNTER — PROCEDURE VISIT (OUTPATIENT)
Dept: DERMATOLOGY | Facility: CLINIC | Age: 43
End: 2023-07-26
Payer: COMMERCIAL

## 2023-07-26 DIAGNOSIS — L91.8 SKIN TAG: Primary | ICD-10-CM

## 2023-07-26 PROCEDURE — 11200 RMVL SKIN TAGS UP TO&INC 15: CPT | Mod: CSM,S$GLB,, | Performed by: DERMATOLOGY

## 2023-07-26 PROCEDURE — 11200 PR REMOVAL OF SKIN TAGS, UP TO 15: ICD-10-PCS | Mod: CSM,S$GLB,, | Performed by: DERMATOLOGY

## 2023-07-26 NOTE — PROGRESS NOTES
Cosmetic treatment today performed for destruction of 2 tags of the r pit.  Risks of scar and recurrence reviewed.  $150.

## 2023-08-02 DIAGNOSIS — E55.9 VITAMIN D INSUFFICIENCY: ICD-10-CM

## 2023-08-02 NOTE — TELEPHONE ENCOUNTER
OPERATIVE REPORT      DATE OF SURGERY:  04/18/19    SURGEON:  Kwabena Abernathy MD    ASSISTANT:  BAILEY Avila MD, assistant helped with placement of trocars, retraction of tissues and closure of incisions    PREOPERATIVE DIAGNOSIS(ES): Cystocele, vaginal apical prolapse, stress urinary incontinence, voiding dysfunction and ISD    POSTOPERATIVE DIAGNOSIS(ES):  Same and adhesions    PROCEDURE DONE:  Robotic Sacrocolpopexy, Lynx sling procedure, Lysis of adhesions and a cystoscopy    INDICATIONS:  The patient is a 77 year-old white female with a history of a vaginal prolapse and urinary incontinence which has been worsening over the last year.  She leaks with standing, urgency and walking.  She does not empty well.  Urodynamic testing was consistent with a small bladder Capacity, a dysfunctional voiding pattern, stress urinary incontinence and ISD.  Options were discussed with and she opted for surgery.    FINDINGS: Adhesions were found between the small bowel, the bladder and the vagina.  There is also some adhesions of the bowel to the anterior abdominal wall.  It took over 45 minutes to lyse these adhesions.    IMPLANT: Upsilon Y-Mesh    PROCEDURE NOTE:  After appropriate consents for surgery were obtained, the patient was taken to the operating room.  General anesthesia was administered. She was then prepped and draped in a sterile manner. A Hoyt catheter was placed. EA Sizer was then used and length of the vagina was determined. Steri-strips were placed on the EA Sizer to pilar the position. An incision was then made supraumbilically. The Veress needle was introduced into the abdomen. The abdomen was insufflated with carbon dioxide to a pressure of 15mmHg. The 8 mm trocar was then placed through this. The laparoscope was then placed through the trocar.  Under direct visualization, 4 other trocars were placed approximately 8 cm lateral and 2 cm below the first trocar, 2 on either side.  Robot was brought  LOV 3/20/23   into position.  Targeting was then performed.  All the trocars were docked onto the robot.  The surgeon then went to the surgeon's console.      Once this was done, the vagina was pushed completely up.  There were multiple adhesions between the small bowel, the bladder and the vagina.  45 minutes was spent dissecting this area to proceed with the surgery.  Also there was some adhesions of the bowel to the anterior abdominal wall which was taken down. The perineum overlaying the vagina was then opened all the way to the bladder anteriorly and posterior all the way to the rectovaginal junction as determined by placing an EA Sizer in the rectum. Once this whole area was cleared up, the camera was changed to 30 degrees scope. The sacral promontory was inspected. There was a lot of fat in this area. ICG was then injected intraveneously using 5 mL. The firefly technology was used. The blood vessels were identified. Some of the blood vessels were them cauterized and transected. The area was carefully dissected using the firefly technology. The middle sacral ligament was then identified and cleaned of all the fat.  The middle sacral vein was also identified.  It could be retracted laterally to the left.  The smaller blood vessels were cauterized.  The scope was then changed to 0 degree scope. Upsilon Y mesh was then used. It was cut to size. The whole Y portion was then placed on the vaginal cuff anteriorly and posteriorly; all the way to the bladder anteriorly and all the way to the rectovaginal junction posteriorly.  This area was somewhat small possibly since she had had previous surgeries and due to adhesions.  The anterior part of the Y mesh was fixated to the mesh with a 2'0 strata fix suture running the suture for at least 4 different lines across the anterior aspect of the Y mesh.  The same procedure was duplicated on the posterior aspect of the Y mesh.  The EEA sizer in the rectum was pushed up to demarcate the  area between the rectum and the vagina.  A cystoscopy was then done.  No sutures were noted in the bladder.  Both ureters were effluxing urine well.    The camera was then changed to the 0 degree scope. The sigmoid colon was then retracted to the left. A 0 Wilkeson-Daniel suture was then used. The first suture was placed through the most distal part of the sacral promontory. A Lasso suture was then used and the mesh was transfixed to where it was just displacing the vagina to which I had previously marked on the EA Sizer. Two other sutures were then placed superior to this, carefully avoiding the bifurcation of the aorta and the middle sacral vessels, thus, completing the mesh placement. The peritoneum overlying the mesh around the sacral promontory was then closed using several figure-of-eight 2-0 Vicryl sutures.  The peritoneum overlying the vagina was also closed with figure-of-eight sutures to hide most of the mesh.  Sigmoid colon was placed under the mesh without any tension. The abdominal pressure was then decreased. No bleeding was noted from any site. The robot was de-docked and removed. The trocars were removed. The incisions were injected with 0.5% Marcaine and closed with 3-0 undyed Vicryl. This completed the Sacrocolpopexy procedure.     At this point, the same diluted solution of lidocaine was injected into the vaginal mucosa underlying the urethra using 5 cc.  A full-thickness dissection was done.  Tunneling was done paraurethrally into the space of Retzius.  The Lynx  sling was then used and the 2 Lynx needles were then passed through the suprapubic incisions that have been made previously.  These incisions were 2 cm lateral to the pubic tubercle on either side.  These needles were then passed the space of Retzius, paraurethrally and out through the vagina.  A cystoscopy was then done.  Both ureters were noted to be effluxing urine.  There was no needle in the bladder.  Also, no mesh was present in the  bladder.  The bladder was then filled with 350 mL.  The cystoscope was removed.  Excessive leakage was noted. The sling was tensioned to 1 peon breadth.  The Crede maneuver was done.  No leakage was noted.  The cystoscopy was then repeated.  The urethral sphincter was coapting well.  FloSeal was then used for hemostasis.  The vaginal mucosa underlying the urethra was then closed with a 2-0 Vicryl.  A gauze soaked in Betadine and and water was then used for packing the vagina.  This completed the procedure.  Estimated blood loss was 50 mL.  Patient tolerated the procedure well.  She was taken to the recovery room in awake and alert, without any complications.  Instrument and lap counts were correct x2.             Electronically Signed On 04.18.2019 11:40  ___________________________________________________   Kwabena Abernathy MD

## 2023-08-03 RX ORDER — ERGOCALCIFEROL 1.25 MG/1
CAPSULE ORAL
Qty: 4 CAPSULE | Refills: 1 | OUTPATIENT
Start: 2023-08-03

## 2023-08-04 NOTE — TELEPHONE ENCOUNTER
My chart message sent to patient   Per Dr. Yoo  Your refill request has been denied.  The prescription states to take it for 8 weeks.  Take over-the-counter 5000 IU daily now.   Patient contacted and given instructions per Dr. Yoo to take over the counter vitamin 5000 IU patient verbalized understanding.

## 2023-10-25 PROBLEM — Z71.85 IMMUNIZATION COUNSELING: Status: ACTIVE | Noted: 2023-10-25

## 2023-10-25 PROBLEM — Z71.84 TRAVEL ADVICE ENCOUNTER: Status: ACTIVE | Noted: 2023-10-25

## 2023-10-29 NOTE — PROGRESS NOTES
[facility]fectious Disease [facility] - Travel Consult    Akilah Parker presents today for pretravel consultation.      Traveling to:  [facility]  Date arrival:  Dec 6th - 15th  Duration of stay: 7d  Areas in country: urban    Accommodations: hotel  Purpose of travel: vacation  Countries previously traveled to: -- [place], [place], [place]  Hx of travel related infections?: -- None  Hx of travel related immunizations?: -- none    Patient denies recent fevers, chills or infectious illnesses.   Denies hx of splenctomy.   No history of immunosuppression.     Past Medical History:   Diagnosis Date    Acne vulgaris     Fibroids, intramural 05/27/2020    Ganglion cyst     bilateral     GERD (gastroesophageal reflux disease)     History of kidney stones 2012    Hypertension     IN PAST    CHARISSE (iron deficiency anemia)     Menorrhagia with regular cycle 05/12/2020    Nephrolithiasis     PONV (postoperative nausea and vomiting)     Thyroid nodule 2014    Scattered micro nodules, stable in size compared to 2014       Current Outpatient Medications on File Prior to Visit   Medication Sig Dispense Refill    amLODIPine (NORVASC) 10 MG tablet Take 1 tablet (10 mg total) by mouth once daily. 90 tablet 3    ergocalciferol (ERGOCALCIFEROL) 50,000 unit Cap 1 po weekly x 8 wks 4 capsule 1    hydroCHLOROthiazide (HYDRODIURIL) 25 MG tablet Take 1 tablet (25 mg total) by mouth once daily. 90 tablet 3    omeprazole (PRILOSEC) 40 MG capsule Take 1 capsule (40 mg total) by mouth before breakfast. Take one pill every morning 45 minutes before breakfast 90 capsule 1    tretinoin (RETIN-A) 0.05 % cream Apply topically every evening. Start with every other night and move up to nightly after 2 weeks if not too dry.  face 20 g 1    cyclobenzaprine (FLEXERIL) 5 MG tablet Take 1 tablet (5 mg total) by mouth 3 (three) times daily as needed for Muscle spasms. 60 tablet 1    meloxicam (MOBIC) 15 MG tablet Take 1 tablet (15 mg total) by mouth once daily. 60 tablet  1     No current facility-administered medications on file prior to visit.       Review of patient's allergies indicates:  No Known Allergies    Immunization History   Administered Date(s) Administered    COVID-19, MRNA, LN-S, PF (Pfizer) (Purple Cap) 01/02/2021, 01/23/2021    DTaP 07/23/2021    Influenza 10/06/2016, 10/02/2017, 10/05/2018, 10/07/2019, 11/10/2020    Influenza - Quadrivalent - PF *Preferred* (6 months and older) 10/06/2016, 10/02/2017, 10/05/2018, 10/07/2019, 11/10/2020, 11/17/2021, 10/14/2022    Tdap 07/23/2021    Typhoid - ViCPs 10/30/2023    Yellow Fever 10/30/2023       Review of Systems   Constitutional:  Negative for chills, diaphoresis, fever and weight loss.   HENT:  Negative for congestion, sinus pain and sore throat.    Eyes:  Negative for pain and discharge.   Respiratory:  Negative for cough, sputum production and shortness of breath.    Cardiovascular:  Negative for chest pain and leg swelling.   Gastrointestinal:  Negative for abdominal pain, diarrhea, nausea and vomiting.   Genitourinary:  Negative for dysuria and hematuria.   Musculoskeletal:  Negative for joint pain.   Skin:  Negative for rash.   Neurological:  Negative for focal weakness and headaches.   Endo/Heme/Allergies:  Negative for environmental allergies.   Psychiatric/Behavioral:  Negative for substance abuse. The patient is not nervous/anxious.          Physical Exam  Vitals reviewed.   Constitutional:       General: She is not in acute distress.     Appearance: Normal appearance. She is not ill-appearing, toxic-appearing or diaphoretic.   HENT:      Head: Normocephalic and atraumatic.      Nose: No congestion or rhinorrhea.      Mouth/Throat:      Mouth: Mucous membranes are moist.      Pharynx: Oropharynx is clear.   Eyes:      General: No scleral icterus.     Conjunctiva/sclera: Conjunctivae normal.   Cardiovascular:      Rate and Rhythm: Normal rate.      Heart sounds: Normal heart sounds.   Pulmonary:      Effort:  Pulmonary effort is normal.      Breath sounds: Normal breath sounds.   Abdominal:      General: Abdomen is flat. Bowel sounds are normal. There is no distension.      Palpations: Abdomen is soft.      Tenderness: There is no abdominal tenderness.   Musculoskeletal:         General: No swelling or tenderness. Normal range of motion.      Cervical back: Normal range of motion. No tenderness.      Right lower leg: No edema.      Left lower leg: No edema.   Lymphadenopathy:      Cervical: No cervical adenopathy.   Skin:     General: Skin is warm and dry.      Coloration: Skin is not jaundiced.      Findings: No erythema or rash.   Neurological:      Mental Status: She is alert and oriented to person, place, and time. Mental status is at baseline.   Psychiatric:         Behavior: Behavior normal.         Thought Content: Thought content normal.           ASSESSMENT:   Problem Noted   Travel Advice Encounter 10/25/2023   Immunization Counseling 10/25/2023         PLAN:  The Patient was provided with an extensive travel guidance packet which provides travel information specific to the patients itinerary.     The patient's immunization history was reviewed and, based on the patient's itinerary, immunizations were ordered.      Infectious Disease Risks:      Mosquito Borne pathogens:  Reviewed basic mosquito avoidance precautions including wearing long sleeve clothing and insect repellant. The patient was instructed to purchase insect repellent containing DEET or [place] and apply according to repellent label instructions.     Malarone (# 17 tablets) was prescribed for malaria prophylaxis and possible side effects were reviewed.    -- eRx sent to pt requested pharm: Atovaquone-proguanil 250-100 mg PO qdaily, start 2 days before expected exposure and end 7 days after last day of exposure. Disp# 17  [NOTE: Malarone: C/I in pts w/ APCKD, and other CKD w/ CrCl < 30 ml/min]       Counseled patient on:   - Zika precautions and  to abstain or practice safe sex for a period of 3 months (males) and 8 weeks (females) upon return.     - Yellow Fever Vaccine (live): recommended, completed today in clinic w/o issue.   precautions, indications as well as benefits vs. Risks of immunization; which includes but is not limited to adverse events ranging from mild (headache, myalgias/arthralgias, fever, fatigue), to more severe and potentially fatal reactions, such as neurotrophic and viscerotrophic reactions w/ multiorgan failure. Those at relative increased risk for such severe reactions: >59yo, and immunocompromised. Also, Transfusion-related transmission of yellow fever vaccine virus has been reported; informed to wait 2 weeks after immunization with yellow fever vaccine to donate blood.       Food Borne pathogens: Reviewed basic hand, food and water sanitation precautions.  Patient instructed to take hand  on their trip.     Vaccinations:      --Typhoid vaccine: Recommended, completed in clinic w/o issue.       -- HepA: Recommended, ordered. Pt to RTC to complete at later date.     Traveler's Diarrhea: The patient was instructed to purchase Imodium over the counter to take in case diarrhea (without blood or fever) develops.   -- Azithromycin was ordered for treatment if severe or bloody diarrhea develops and the patient was instructed on use and possible side effects.   - eRX sent to pt requested pharm: Azithromycin 500 mg PO x 3 days, Disp#4    -- Also, counseled patient on recommended use of imodium for mild-mod TD: Antimotility agents sometimes induce prolonged constipation even at low doses, and they can lead to a bloated, uncomfortable feeling if taken for moderately severe infections without taking an antibiotic as well. Use of these agents should be discontinued if symptoms last more than 48 hours. Bowel immobilizers are not recommended in travelers with fever or presence of blood in the stool.      Blood Borne Pathogens: HepB  immunity status: Unknown;    Hep-B records or serologies not available for review.   HBV: Recommended, ordered. Pt to RTC to complete at later date.             Immunization Hx: Reviewed.     Immunizations Up-to-date / Immune  Due / Recommended  Ordered    Hep-A    (0, 6 mo)   IMM24  [] UTD  [x] Due / rec      [x]  ordered   Dose#1 today   Dose#2 @6mo    [] Provided Rx    [] Pt deferred, to f/u with PCP      Hep-B  [id number]   (0, 1mo)  [x] UTD  [] Due / rec  []  ordered   Dose#1 today   Dose#2 @1mo    [] Provided Rx    [] Pt deferred, to f/u with PCP    T-Dap   IMM61  [x] UTD  [] Due / rec  []  ordered    [] Provided Rx    [] Pt deferred, to f/u with PCP    Influenza   FVK153   QJI482 (65+),   high dose  [] UTD  [x] Due / rec  []  ordered    [] Provided Rx    [] Pt deferred, to f/u with PCP    Pneum*   (65+, 19i)   IMM78 (PCV13)   IMM53 (PPSV23)   UFX168 (PCV20)  [] UTD  [] Due / recommended            [x]Not indicated    []  ordered    [] Provided Rx    [] Pt deferred, to f/u with PCP    Shingrex  (50+, 19i)   IMM88   (0, 2-6 mo)  [] UTD  [] Due / recommended         [x]Not indicated    []  ordered  Dose#1 today  Dose#2 @2-6mo    [] Provided Rx    [] Pt deferred, to f/u with PCP    Typhoid   IMM64   (Boost Q2yr)    --at-least 2wks before travel  [] UTD  [x] Due / recommended      [x]  ordered    [] Pt deferred, to f/u with PCP    Ylw Fever    (live)   IMM68    -- at-least 10d before travel  [] UTD  [x] Due / recommended      []Not indicated    [x]  ordered    [] Pt deferred, to f/u with PCP    Italian Encephalitis   --2 doses Day 0 &28    -- at-least 1wk before travel  [] UTD  [] Due / recommended     [x] Not indicated / Negligible risk per travel itinerary    []  Ordered      [] Pt deferred, to f/u with PCP    Polio-IPV   IMM51    -- at-least 4wks before travel  [] UTD  [x] Due / recommended      []Not indicated    [x]  ordered    Meningo^   SYF542   [id number] (conj)    -- at-least 2-3wks before travel  [] UTD  [x]  Due / recommended      []Not indicated  [x]  ordered    [] Pt deferred, to f/u with PCP     *Prior pneumo vax:   If PPSV23 --> PCV20 1yr later  (complete)  If PCV13 (any age) --> PPSV23 or PCV20 1yr later (complete)  *None prior: PCV20  //   PCV15/13 --> PCV23 (1yr later;or 8wks if immunocompromised)  *Immunocompromised:   PCV13 (any age)--8wks-> PPSV23 (<65) --5yrs-> PPSV23 (<65)--5yrs-> PPSV23 (>65)  ^Meningococcal (quadrivalent): 1 or 2 doses (in adolescence; for travelers in endemic areas for A, C, Y, W).  Polio: (4-dose series completed in childhood before ~5yo); For travel ([place]), may give one-time booster dose of polio vaccine (IPV).    -- The patient was encouraged to contact us about any problems that may develop after immunization and possible side effects were reviewed.     Environmental risks:   The patient's medical history was reviewed and the patient was counseled on:  Precautions to minimize risk/exposure to crime and motor vehicle accidents  Precautions against development of DVT during flight        -- Sit in an aisle seat if possible to allow ease of getting up and around.        -- Walk airplane aisle periodically when its safe to do so.        -- Do calf muscle exercises once an hour while seated to promote muscular                           pumping action of venous blood back into the central circulation.       -- Stay well hydrated, avoid excess consumption of coffee and alcohol, which have a diuretic effect and may contribute to dehydration.       -- Below-knee graduated compression stockings   Precautions to prevent exposure to rabies and seek treatment for possible exposures  Precautions against sun exposure  Personal and travel safety; bring COVID vaccine passport as proof of vaccination.  Dietary precautions    The patient was instructed to contact us if problems develop after travel.    I have spent 30 minutes with the patient, all of which was face to face with greater than 50%  spent counseling.    Orders Placed This Encounter   Procedures    Yellow fever vaccine subcutaneous    Typhoid Vaccine (ViCPs) (IM)    Hepatitis A Vaccine (Adult) (IM)    Hepatitis A Vaccine (Adult) (IM)    (In Office Administered) Meningococcal Conjugate - MCV4O (MENVEO) 1 VIAL Ages 10 Years-55 Years    Poliovirus vaccine IPV subcutaneous/IM    Influenza - Quadrivalent *Preferred* (6 months+) (PF)

## 2023-10-30 ENCOUNTER — CLINICAL SUPPORT (OUTPATIENT)
Dept: INFECTIOUS DISEASES | Facility: CLINIC | Age: 43
End: 2023-10-30

## 2023-10-30 ENCOUNTER — OFFICE VISIT (OUTPATIENT)
Dept: INFECTIOUS DISEASES | Facility: CLINIC | Age: 43
End: 2023-10-30

## 2023-10-30 VITALS
HEIGHT: 66 IN | HEART RATE: 71 BPM | TEMPERATURE: 100 F | SYSTOLIC BLOOD PRESSURE: 143 MMHG | BODY MASS INDEX: 38.83 KG/M2 | WEIGHT: 241.63 LBS | DIASTOLIC BLOOD PRESSURE: 92 MMHG

## 2023-10-30 DIAGNOSIS — Z71.85 IMMUNIZATION COUNSELING: Primary | ICD-10-CM

## 2023-10-30 DIAGNOSIS — Z71.85 IMMUNIZATION COUNSELING: ICD-10-CM

## 2023-10-30 DIAGNOSIS — Z71.84 TRAVEL ADVICE ENCOUNTER: ICD-10-CM

## 2023-10-30 PROCEDURE — 90691 TYPHOID VICPS VACCINE IM: ICD-10-PCS | Mod: S$GLB,,, | Performed by: INTERNAL MEDICINE

## 2023-10-30 PROCEDURE — 90472 IMMUNIZATION ADMIN EACH ADD: CPT | Mod: S$GLB,,, | Performed by: INTERNAL MEDICINE

## 2023-10-30 PROCEDURE — 99999 PR PBB SHADOW E&M-EST. PATIENT-LVL I: ICD-10-PCS | Mod: PBBFAC,,,

## 2023-10-30 PROCEDURE — 90472 TYPHOID VICPS VACCINE IM: ICD-10-PCS | Mod: S$GLB,,, | Performed by: INTERNAL MEDICINE

## 2023-10-30 PROCEDURE — 99999 PR PBB SHADOW E&M-EST. PATIENT-LVL III: CPT | Mod: PBBFAC,,, | Performed by: STUDENT IN AN ORGANIZED HEALTH CARE EDUCATION/TRAINING PROGRAM

## 2023-10-30 PROCEDURE — 99402 PR PREVENT COUNSEL,INDIV,30 MIN: ICD-10-PCS | Mod: S$GLB,,, | Performed by: STUDENT IN AN ORGANIZED HEALTH CARE EDUCATION/TRAINING PROGRAM

## 2023-10-30 PROCEDURE — 90691 TYPHOID VACCINE IM: CPT | Mod: S$GLB,,, | Performed by: INTERNAL MEDICINE

## 2023-10-30 PROCEDURE — 99402 PREV MED CNSL INDIV APPRX 30: CPT | Mod: S$GLB,,, | Performed by: STUDENT IN AN ORGANIZED HEALTH CARE EDUCATION/TRAINING PROGRAM

## 2023-10-30 PROCEDURE — 99999 PR PBB SHADOW E&M-EST. PATIENT-LVL III: ICD-10-PCS | Mod: PBBFAC,,, | Performed by: STUDENT IN AN ORGANIZED HEALTH CARE EDUCATION/TRAINING PROGRAM

## 2023-10-30 PROCEDURE — 90717 YELLOW FEVER VACCINE SUBQ: CPT | Mod: S$GLB,,, | Performed by: INTERNAL MEDICINE

## 2023-10-30 PROCEDURE — 90717 YELLOW FEVER VACCINE SQ: ICD-10-PCS | Mod: S$GLB,,, | Performed by: INTERNAL MEDICINE

## 2023-10-30 PROCEDURE — 90471 IMMUNIZATION ADMIN: CPT | Mod: S$GLB,,, | Performed by: INTERNAL MEDICINE

## 2023-10-30 PROCEDURE — 90471 YELLOW FEVER VACCINE SQ: ICD-10-PCS | Mod: S$GLB,,, | Performed by: INTERNAL MEDICINE

## 2023-10-30 PROCEDURE — 99999 PR PBB SHADOW E&M-EST. PATIENT-LVL I: CPT | Mod: PBBFAC,,,

## 2023-10-30 RX ORDER — ATOVAQUONE AND PROGUANIL HYDROCHLORIDE 250; 100 MG/1; MG/1
1 TABLET, FILM COATED ORAL DAILY
Qty: 17 TABLET | Refills: 0 | Status: SHIPPED | OUTPATIENT
Start: 2023-10-30 | End: 2023-11-16

## 2023-10-30 NOTE — PROGRESS NOTES
Patient received typhoid IM to the right deltoid.  And also yellow fever sub Q to the right arm w/yellow card documentation.  Tolerated well and left in NAD

## 2023-11-06 ENCOUNTER — CLINICAL SUPPORT (OUTPATIENT)
Dept: INFECTIOUS DISEASES | Facility: CLINIC | Age: 43
End: 2023-11-06
Payer: COMMERCIAL

## 2023-11-06 DIAGNOSIS — Z71.84 TRAVEL ADVICE ENCOUNTER: ICD-10-CM

## 2023-11-06 DIAGNOSIS — Z71.85 IMMUNIZATION COUNSELING: ICD-10-CM

## 2023-11-06 PROCEDURE — 90734 MENACWYD/MENACWYCRM VACC IM: CPT | Mod: S$GLB,,, | Performed by: INTERNAL MEDICINE

## 2023-11-06 PROCEDURE — 90713 POLIOVIRUS VACCINE IPV SQ/IM: ICD-10-PCS | Mod: S$GLB,,, | Performed by: INTERNAL MEDICINE

## 2023-11-06 PROCEDURE — 90632 HEPA VACCINE ADULT IM: CPT | Mod: S$GLB,,, | Performed by: INTERNAL MEDICINE

## 2023-11-06 PROCEDURE — 90472 HEPATITIS A VACCINE ADULT IM: ICD-10-PCS | Mod: S$GLB,,, | Performed by: INTERNAL MEDICINE

## 2023-11-06 PROCEDURE — 90471 MENINGOCOCCAL CONJUGATE VACCINE 4-VALENT IM (MENVEO) 1 VIAL AGES 10 YEARS-55 YEARS: ICD-10-PCS | Mod: S$GLB,,, | Performed by: INTERNAL MEDICINE

## 2023-11-06 PROCEDURE — 90632 HEPATITIS A VACCINE ADULT IM: ICD-10-PCS | Mod: S$GLB,,, | Performed by: INTERNAL MEDICINE

## 2023-11-06 PROCEDURE — 90471 IMMUNIZATION ADMIN: CPT | Mod: S$GLB,,, | Performed by: INTERNAL MEDICINE

## 2023-11-06 PROCEDURE — 90734 MENINGOCOCCAL CONJUGATE VACCINE 4-VALENT IM (MENVEO) 1 VIAL AGES 10 YEARS-55 YEARS: ICD-10-PCS | Mod: S$GLB,,, | Performed by: INTERNAL MEDICINE

## 2023-11-06 PROCEDURE — 90472 IMMUNIZATION ADMIN EACH ADD: CPT | Mod: S$GLB,,, | Performed by: INTERNAL MEDICINE

## 2023-11-06 PROCEDURE — 90713 POLIOVIRUS IPV SC/IM: CPT | Mod: S$GLB,,, | Performed by: INTERNAL MEDICINE

## 2023-11-06 NOTE — PROGRESS NOTES
Patient received the IPV vaccine in the right deltoid, and the Menveo and the Hep A vaccine in the left deltoid. Pt tolerated well. Pt asked to wait in the clinic 15 minutes after injection in the event of an allergic reaction. Pt verbalized understanding. Pt left in NAD.

## 2023-11-27 ENCOUNTER — OFFICE VISIT (OUTPATIENT)
Dept: PRIMARY CARE CLINIC | Facility: CLINIC | Age: 43
End: 2023-11-27
Payer: COMMERCIAL

## 2023-11-27 ENCOUNTER — TELEPHONE (OUTPATIENT)
Dept: ENDOSCOPY | Facility: HOSPITAL | Age: 43
End: 2023-11-27
Payer: COMMERCIAL

## 2023-11-27 VITALS
DIASTOLIC BLOOD PRESSURE: 86 MMHG | BODY MASS INDEX: 38.72 KG/M2 | WEIGHT: 240.94 LBS | HEIGHT: 66 IN | SYSTOLIC BLOOD PRESSURE: 128 MMHG | HEART RATE: 69 BPM | OXYGEN SATURATION: 99 %

## 2023-11-27 VITALS — BODY MASS INDEX: 38.73 KG/M2 | HEIGHT: 66 IN | WEIGHT: 241 LBS

## 2023-11-27 DIAGNOSIS — I10 PRIMARY HYPERTENSION: ICD-10-CM

## 2023-11-27 DIAGNOSIS — Z00.00 ROUTINE MEDICAL EXAM: Primary | ICD-10-CM

## 2023-11-27 DIAGNOSIS — K21.9 GASTROESOPHAGEAL REFLUX DISEASE, UNSPECIFIED WHETHER ESOPHAGITIS PRESENT: ICD-10-CM

## 2023-11-27 DIAGNOSIS — Z79.899 ENCOUNTER FOR MONITORING LONG-TERM PROTON PUMP INHIBITOR THERAPY: Chronic | ICD-10-CM

## 2023-11-27 DIAGNOSIS — Z51.81 ENCOUNTER FOR MONITORING LONG-TERM PROTON PUMP INHIBITOR THERAPY: Chronic | ICD-10-CM

## 2023-11-27 DIAGNOSIS — Z83.719 FAMILY HISTORY OF COLONIC POLYPS: ICD-10-CM

## 2023-11-27 DIAGNOSIS — E66.01 SEVERE OBESITY (BMI 35.0-39.9) WITH COMORBIDITY: ICD-10-CM

## 2023-11-27 DIAGNOSIS — Z82.3 FAMILY HISTORY OF STROKE: ICD-10-CM

## 2023-11-27 DIAGNOSIS — E55.9 VITAMIN D INSUFFICIENCY: ICD-10-CM

## 2023-11-27 PROCEDURE — 99396 PREV VISIT EST AGE 40-64: CPT | Mod: S$GLB,,, | Performed by: FAMILY MEDICINE

## 2023-11-27 PROCEDURE — 3008F BODY MASS INDEX DOCD: CPT | Mod: CPTII,S$GLB,, | Performed by: FAMILY MEDICINE

## 2023-11-27 PROCEDURE — 3079F PR MOST RECENT DIASTOLIC BLOOD PRESSURE 80-89 MM HG: ICD-10-PCS | Mod: CPTII,S$GLB,, | Performed by: FAMILY MEDICINE

## 2023-11-27 PROCEDURE — 1160F RVW MEDS BY RX/DR IN RCRD: CPT | Mod: CPTII,S$GLB,, | Performed by: FAMILY MEDICINE

## 2023-11-27 PROCEDURE — 1159F PR MEDICATION LIST DOCUMENTED IN MEDICAL RECORD: ICD-10-PCS | Mod: CPTII,S$GLB,, | Performed by: FAMILY MEDICINE

## 2023-11-27 PROCEDURE — 99396 PR PREVENTIVE VISIT,EST,40-64: ICD-10-PCS | Mod: S$GLB,,, | Performed by: FAMILY MEDICINE

## 2023-11-27 PROCEDURE — 1159F MED LIST DOCD IN RCRD: CPT | Mod: CPTII,S$GLB,, | Performed by: FAMILY MEDICINE

## 2023-11-27 PROCEDURE — 3074F PR MOST RECENT SYSTOLIC BLOOD PRESSURE < 130 MM HG: ICD-10-PCS | Mod: CPTII,S$GLB,, | Performed by: FAMILY MEDICINE

## 2023-11-27 PROCEDURE — 99999 PR PBB SHADOW E&M-EST. PATIENT-LVL III: CPT | Mod: PBBFAC,,, | Performed by: FAMILY MEDICINE

## 2023-11-27 PROCEDURE — 3079F DIAST BP 80-89 MM HG: CPT | Mod: CPTII,S$GLB,, | Performed by: FAMILY MEDICINE

## 2023-11-27 PROCEDURE — 3008F PR BODY MASS INDEX (BMI) DOCUMENTED: ICD-10-PCS | Mod: CPTII,S$GLB,, | Performed by: FAMILY MEDICINE

## 2023-11-27 PROCEDURE — 3074F SYST BP LT 130 MM HG: CPT | Mod: CPTII,S$GLB,, | Performed by: FAMILY MEDICINE

## 2023-11-27 PROCEDURE — 99999 PR PBB SHADOW E&M-EST. PATIENT-LVL III: ICD-10-PCS | Mod: PBBFAC,,, | Performed by: FAMILY MEDICINE

## 2023-11-27 PROCEDURE — 1160F PR REVIEW ALL MEDS BY PRESCRIBER/CLIN PHARMACIST DOCUMENTED: ICD-10-PCS | Mod: CPTII,S$GLB,, | Performed by: FAMILY MEDICINE

## 2023-11-27 RX ORDER — OMEPRAZOLE 40 MG/1
40 CAPSULE, DELAYED RELEASE ORAL
Qty: 90 CAPSULE | Refills: 1 | Status: SHIPPED | OUTPATIENT
Start: 2023-11-27 | End: 2024-05-25

## 2023-11-27 RX ORDER — HYDROCHLOROTHIAZIDE 25 MG/1
25 TABLET ORAL DAILY
Qty: 90 TABLET | Refills: 3 | Status: SHIPPED | OUTPATIENT
Start: 2023-11-27

## 2023-11-27 NOTE — TELEPHONE ENCOUNTER
Received patient's call to schedule for a colonoscopy in 2023. Offered the only availability on 12/1/23, patient declined. Patient states she will call again tomorrow hoping for another cancellation for this year.   
left

## 2023-11-27 NOTE — PROGRESS NOTES
"    /86 (BP Location: Left arm, Patient Position: Sitting)   Pulse 69   Ht 5' 6" (1.676 m)   Wt 109.3 kg (240 lb 15.4 oz)   LMP 07/25/2020 (Exact Date) Comment: 7/2020  SpO2 99%   BMI 38.89 kg/m²       ===========    Chief Complaint: No chief complaint on file.          HPI    Akilah Parker is a 43 y.o. female     here for    Annual Wellness/Preventative Exam.  Health maintenance reviewed with patient in detail inc any recent labs and studies and needs for future screening labs.  Age-appropriate vaccines and other age-appropriate screening studies reviewed with patient in detail.  Sleep health reviewed with patient.  Skin health regarding possible skin cancer screening reviewed with patient.  General regularity of bowel movements and urinations reviewed with patient including any possibility of urine leakage.  Vision screening reviewed with patient.      Patient queried and denies any further complaints      Patient Active Problem List   Diagnosis    Degeneration of lumbar or lumbosacral intervertebral disc    Lumbago    Sacroiliac joint pain    Greater trochanteric bursitis    Multiple thyroid nodules    Acne vulgaris    GERD (gastroesophageal reflux disease)    Primary hypertension    Cyanocobalamin deficiency    Bilateral leg edema    S/P total laparoscopic hysterectomy 8/2020    T/A hypertrophy    Vitamin D deficiency    Severe obesity (BMI 35.0-39.9) with comorbidity    Immunization counseling    Family history of stroke       SURGICAL AND MEDICAL HISTORY: updated and reviewed.  Past Surgical History:   Procedure Laterality Date    LAPAROSCOPIC TOTAL HYSTERECTOMY N/A 8/5/2020    Procedure: HYSTERECTOMY, TOTAL, LAPAROSCOPIC;  Surgeon: Claudio Oliva MD;  Location: [facility] OR;  Service: OB/GYN;  Laterality: N/A;  PRE-OP BY RN 7-    ORIF FOOT FRACTURE      right ankle    OTHER SURGICAL HISTORY      retrieved kidney stone without lithotripsy    SALPINGECTOMY Bilateral 8/5/2020    Procedure: " SALPINGECTOMY;  Surgeon: Claudio Oliva MD;  Location: [facility] OR;  Service: OB/GYN;  Laterality: Bilateral;    TONSILLECTOMY, ADENOIDECTOMY Bilateral 11/17/2021    Procedure: TONSILLECTOMY AND ADENOIDECTOMY;  Surgeon: Delano Henning MD;  Location: [facility] OR;  Service: ENT;  Laterality: Bilateral;    TUBAL LIGATION       ALLERGIES updated and reviewed.  Review of patient's allergies indicates:  No Known Allergies    CURRENT OUTPATIENT MEDICATIONS updated and reviewed    Current Outpatient Medications:     ergocalciferol (ERGOCALCIFEROL) 50,000 unit Cap, 1 po weekly x 8 wks, Disp: 4 capsule, Rfl: 1    tretinoin (RETIN-A) 0.05 % cream, Apply topically every evening. Start with every other night and move up to nightly after 2 weeks if not too dry.  face, Disp: 20 g, Rfl: 1    hydroCHLOROthiazide (HYDRODIURIL) 25 MG tablet, Take 1 tablet (25 mg total) by mouth once daily., Disp: 90 tablet, Rfl: 3    omeprazole (PRILOSEC) 40 MG capsule, Take 1 capsule (40 mg total) by mouth before breakfast. Take one pill every morning 45 minutes before breakfast, Disp: 90 capsule, Rfl: 1    Review of Systems   Constitutional:  Negative for activity change, appetite change, chills, diaphoresis, fatigue, fever and unexpected weight change.   HENT:  Negative for congestion, ear discharge, ear pain, facial swelling, hearing loss, nosebleeds, postnasal drip, rhinorrhea, sinus pressure, sneezing, sore throat, tinnitus, trouble swallowing and voice change.    Eyes:  Negative for photophobia, pain, discharge, redness, itching and visual disturbance.   Respiratory:  Negative for cough, chest tightness, shortness of breath and wheezing.    Cardiovascular:  Negative for chest pain, palpitations and leg swelling.   Gastrointestinal:  Negative for abdominal distention, abdominal pain, anal bleeding, blood in stool, constipation, diarrhea, nausea, rectal pain and vomiting.   Endocrine: Negative for cold intolerance, heat intolerance,  "polydipsia, polyphagia and polyuria.   Genitourinary:  Negative for difficulty urinating, dysuria and flank pain.   Musculoskeletal:  Negative for arthralgias, back pain, joint swelling, myalgias and neck pain.   Skin:  Negative for rash.   Neurological:  Negative for dizziness, tremors, seizures, syncope, speech difficulty, weakness, light-headedness, numbness and headaches.   Psychiatric/Behavioral:  Negative for behavioral problems, confusion, decreased concentration, dysphoric mood, sleep disturbance and suicidal ideas. The patient is not nervous/anxious and is not hyperactive.        /86 (BP Location: Left arm, Patient Position: Sitting)   Pulse 69   Ht 5' 6" (1.676 m)   Wt 109.3 kg (240 lb 15.4 oz)   LMP 07/25/2020 (Exact Date) Comment: 7/2020  SpO2 99%   BMI 38.89 kg/m²   Physical Exam  Vitals and nursing note reviewed.   Constitutional:       General: She is not in acute distress.     Appearance: Normal appearance. She is well-developed. She is not ill-appearing or toxic-appearing.   HENT:      Head: Normocephalic and atraumatic.      Right Ear: Tympanic membrane, ear canal and external ear normal.      Left Ear: Tympanic membrane, ear canal and external ear normal.      Nose: Nose normal.      Mouth/Throat:      Lips: Pink.      Mouth: Mucous membranes are moist.      Pharynx: No oropharyngeal exudate or posterior oropharyngeal erythema.   Eyes:      General: No scleral icterus.        Right eye: No discharge.         Left eye: No discharge.      Extraocular Movements: Extraocular movements intact.      Conjunctiva/sclera: Conjunctivae normal.   Cardiovascular:      Rate and Rhythm: Normal rate and regular rhythm.      Pulses: Normal pulses.      Heart sounds: Normal heart sounds. No murmur heard.  Pulmonary:      Effort: Pulmonary effort is normal. No respiratory distress.      Breath sounds: Normal breath sounds. No wheezing or rales.   Abdominal:      General: Bowel sounds are normal. There " is no distension.      Palpations: Abdomen is soft. There is no mass.      Tenderness: There is no abdominal tenderness. There is no right CVA tenderness, left CVA tenderness, guarding or rebound.      Hernia: No hernia is present.   Musculoskeletal:      Cervical back: Normal range of motion and neck supple. No rigidity or tenderness.   Lymphadenopathy:      Cervical: No cervical adenopathy.   Skin:     General: Skin is warm and dry.   Neurological:      General: No focal deficit present.      Mental Status: She is alert. Mental status is at baseline.   Psychiatric:         Mood and Affect: Mood normal.         Behavior: Behavior normal. Behavior is cooperative.         ASSESSMENT/PLAN  1. Routine medical exam  -     CBC Without Differential; Future; Expected date: 11/27/2023  -     Comprehensive Metabolic Panel; Future; Expected date: 11/27/2023  -     Lipid Panel; Future  -     TSH; Future; Expected date: 12/27/2023  -     Hemoglobin A1C; Future    2. Primary hypertension  Assessment & Plan:  Fair control.  Needs some improvement.  Low-sodium diet.  Patient not taking amlodipine now.  Continue HCTZ.  Follow back up 3-6 months to repeat blood pressure and see if additional medication needed.      Orders:  -     hydroCHLOROthiazide (HYDRODIURIL) 25 MG tablet; Take 1 tablet (25 mg total) by mouth once daily.  Dispense: 90 tablet; Refill: 3    3. Gastroesophageal reflux disease, unspecified whether esophagitis present  -     omeprazole (PRILOSEC) 40 MG capsule; Take 1 capsule (40 mg total) by mouth before breakfast. Take one pill every morning 45 minutes before breakfast  Dispense: 90 capsule; Refill: 1    4. Family history of colonic polyps  -     omeprazole (PRILOSEC) 40 MG capsule; Take 1 capsule (40 mg total) by mouth before breakfast. Take one pill every morning 45 minutes before breakfast  Dispense: 90 capsule; Refill: 1    5. Vitamin D insufficiency  Comments:  Continue vitamin-D.  Orders:  -     omeprazole  (PRILOSEC) 40 MG capsule; Take 1 capsule (40 mg total) by mouth before breakfast. Take one pill every morning 45 minutes before breakfast  Dispense: 90 capsule; Refill: 1    6. Encounter for monitoring long-term proton pump inhibitor therapy  Comments:  Educated on long-term use risk.  Monitor.  Orders:  -     omeprazole (PRILOSEC) 40 MG capsule; Take 1 capsule (40 mg total) by mouth before breakfast. Take one pill every morning 45 minutes before breakfast  Dispense: 90 capsule; Refill: 1    7. Family history of stroke  Assessment & Plan:  Keep blood pressure under good control.  Check lipid panel.        8. Severe obesity (BMI 35.0-39.9) with comorbidity  Assessment & Plan:  Weight loss by calorie restriction and exercise.  Monitor.                Most recent some lab results reviewed, if available.  Any new prescription medications gone over in detail including reason for taking the medication, the general mechanism of action, most common possible side effects and possible costs, etcetera.    Chronic conditions updated. Other than changes or additions as above, cont current medications and maintain follow-up with specialists if indicated.     Francisco Yoo MD  A dictation device was used to produce this document. Use of such devices sometimes results in grammatical errors or replacement of words that sound similarly.

## 2023-11-29 PROBLEM — Z71.84 TRAVEL ADVICE ENCOUNTER: Status: RESOLVED | Noted: 2023-10-25 | Resolved: 2023-11-29

## 2023-11-29 NOTE — ASSESSMENT & PLAN NOTE
Fair control.  Needs some improvement.  Low-sodium diet.  Patient not taking amlodipine now.  Continue HCTZ.  Follow back up 3-6 months to repeat blood pressure and see if additional medication needed.

## 2024-04-17 DIAGNOSIS — Z12.31 OTHER SCREENING MAMMOGRAM: ICD-10-CM

## 2024-07-29 ENCOUNTER — HOSPITAL ENCOUNTER (OUTPATIENT)
Dept: RADIOLOGY | Facility: OTHER | Age: 44
Discharge: HOME | End: 2024-07-29
Attending: FAMILY MEDICINE
Payer: COMMERCIAL

## 2024-07-29 DIAGNOSIS — Z12.31 OTHER SCREENING MAMMOGRAM: ICD-10-CM

## 2024-07-29 PROCEDURE — 77067 SCR MAMMO BI INCL CAD: CPT | Mod: TC

## 2024-07-29 PROCEDURE — 77063 BREAST TOMOSYNTHESIS BI: CPT | Mod: TC

## 2024-08-22 ENCOUNTER — OFFICE VISIT (OUTPATIENT)
Dept: OPTOMETRY | Facility: CLINIC | Age: 44
End: 2024-08-22
Payer: COMMERCIAL

## 2024-08-22 DIAGNOSIS — H52.4 ASTIGMATISM OF BOTH EYES WITH PRESBYOPIA: Primary | ICD-10-CM

## 2024-08-22 DIAGNOSIS — H16.223 KERATOCONJUNCTIVITIS SICCA OF BOTH EYES NOT SPECIFIED AS SJOGREN'S: ICD-10-CM

## 2024-08-22 DIAGNOSIS — H52.203 ASTIGMATISM OF BOTH EYES WITH PRESBYOPIA: Primary | ICD-10-CM

## 2024-08-22 PROCEDURE — 92015 DETERMINE REFRACTIVE STATE: CPT | Mod: S$GLB,,, | Performed by: OPTOMETRIST

## 2024-08-22 PROCEDURE — 1160F RVW MEDS BY RX/DR IN RCRD: CPT | Mod: CPTII,S$GLB,, | Performed by: OPTOMETRIST

## 2024-08-22 PROCEDURE — 92014 COMPRE OPH EXAM EST PT 1/>: CPT | Mod: S$GLB,,, | Performed by: OPTOMETRIST

## 2024-08-22 PROCEDURE — 3044F HG A1C LEVEL LT 7.0%: CPT | Mod: CPTII,S$GLB,, | Performed by: OPTOMETRIST

## 2024-08-22 PROCEDURE — 99999 PR PBB SHADOW E&M-EST. PATIENT-LVL III: CPT | Mod: PBBFAC,,, | Performed by: OPTOMETRIST

## 2024-08-22 PROCEDURE — 1159F MED LIST DOCD IN RCRD: CPT | Mod: CPTII,S$GLB,, | Performed by: OPTOMETRIST

## 2024-08-22 NOTE — PROGRESS NOTES
HPI    44 Y/o female is here for routine eye exam with C/o pt say's recently her   eye's have been sensitive to light, and vision has been blurry pt say's   she this the issues last week.   Pt denies pain and discomfort   No f/f    Eye med: no gtt   Last edited by Remington Jones MA on 8/22/2024  7:49 AM.            Assessment /Plan     For exam results, see Encounter Report.    Astigmatism of both eyes with presbyopia    Keratoconjunctivitis sicca of both eyes not specified as Sjogren's      Astig/presby--wrote bifocal Rx.  Discussed adaptation  PRUDENCE--advised SYSTANE COMPLETE Ats QID    PLAN:    Pt will message if sx persist w ATs, otherwise rtc 1 yr

## 2024-12-18 ENCOUNTER — OFFICE VISIT (OUTPATIENT)
Dept: PRIMARY CARE CLINIC | Facility: CLINIC | Age: 44
End: 2024-12-18
Payer: COMMERCIAL

## 2024-12-18 VITALS
HEIGHT: 66 IN | WEIGHT: 198.44 LBS | RESPIRATION RATE: 18 BRPM | BODY MASS INDEX: 31.89 KG/M2 | HEART RATE: 79 BPM | SYSTOLIC BLOOD PRESSURE: 124 MMHG | DIASTOLIC BLOOD PRESSURE: 72 MMHG | OXYGEN SATURATION: 100 %

## 2024-12-18 DIAGNOSIS — K21.9 GASTROESOPHAGEAL REFLUX DISEASE, UNSPECIFIED WHETHER ESOPHAGITIS PRESENT: ICD-10-CM

## 2024-12-18 DIAGNOSIS — Z83.719 FAMILY HISTORY OF COLONIC POLYPS: ICD-10-CM

## 2024-12-18 DIAGNOSIS — I10 PRIMARY HYPERTENSION: ICD-10-CM

## 2024-12-18 DIAGNOSIS — E55.9 VITAMIN D INSUFFICIENCY: ICD-10-CM

## 2024-12-18 DIAGNOSIS — Z00.00 ROUTINE MEDICAL EXAM: Primary | ICD-10-CM

## 2024-12-18 PROBLEM — E66.01 SEVERE OBESITY (BMI 35.0-39.9) WITH COMORBIDITY: Status: RESOLVED | Noted: 2023-03-26 | Resolved: 2024-12-18

## 2024-12-18 PROCEDURE — 3074F SYST BP LT 130 MM HG: CPT | Mod: CPTII,S$GLB,, | Performed by: FAMILY MEDICINE

## 2024-12-18 PROCEDURE — 1160F RVW MEDS BY RX/DR IN RCRD: CPT | Mod: CPTII,S$GLB,, | Performed by: FAMILY MEDICINE

## 2024-12-18 PROCEDURE — 3044F HG A1C LEVEL LT 7.0%: CPT | Mod: CPTII,S$GLB,, | Performed by: FAMILY MEDICINE

## 2024-12-18 PROCEDURE — 3078F DIAST BP <80 MM HG: CPT | Mod: CPTII,S$GLB,, | Performed by: FAMILY MEDICINE

## 2024-12-18 PROCEDURE — 99396 PREV VISIT EST AGE 40-64: CPT | Mod: S$GLB,,, | Performed by: FAMILY MEDICINE

## 2024-12-18 PROCEDURE — 99999 PR PBB SHADOW E&M-EST. PATIENT-LVL IV: CPT | Mod: PBBFAC,,, | Performed by: FAMILY MEDICINE

## 2024-12-18 PROCEDURE — 3008F BODY MASS INDEX DOCD: CPT | Mod: CPTII,S$GLB,, | Performed by: FAMILY MEDICINE

## 2024-12-18 PROCEDURE — 1159F MED LIST DOCD IN RCRD: CPT | Mod: CPTII,S$GLB,, | Performed by: FAMILY MEDICINE

## 2024-12-18 RX ORDER — HYDROCHLOROTHIAZIDE 25 MG/1
25 TABLET ORAL DAILY
Qty: 90 TABLET | Refills: 3 | Status: SHIPPED | OUTPATIENT
Start: 2024-12-18

## 2024-12-18 NOTE — PROGRESS NOTES
"      /72 (BP Location: Left arm)   Pulse 79   Resp 18   Ht 5' 6" (1.676 m)   Wt 90 kg (198 lb 6.6 oz)   LMP 07/25/2020 (Exact Date) Comment: 7/2020  SpO2 100%   BMI 32.02 kg/m²       ===========              Akilah Abelino Parker is a 44 y.o. female     here for    Annual exam.    Health maintenance reviewed with patient in detail inc any recent labs and studies and needs for future screening labs.  Age-appropriate vaccines and other age-appropriate screening studies reviewed with patient in detail.  Sleep health reviewed with patient.  Skin health regarding possible skin cancer screening reviewed with patient.  General regularity of bowel movements and urinations reviewed with patient including any possibility of urine leakage.  Vision screening reviewed with patient.      Patient queried and denies any further complaints      Patient Active Problem List   Diagnosis    Degeneration of lumbar or lumbosacral intervertebral disc    Lumbago    Sacroiliac joint pain    Greater trochanteric bursitis    Multiple thyroid nodules    Acne vulgaris    GERD (gastroesophageal reflux disease)    Primary hypertension    Cyanocobalamin deficiency    Bilateral leg edema    S/P total laparoscopic hysterectomy 8/2020    T/A hypertrophy    Immunization counseling    Family history of stroke    Family history of colonic polyps    Vitamin D insufficiency       SURGICAL AND MEDICAL HISTORY: updated and reviewed.  Past Surgical History:   Procedure Laterality Date    LAPAROSCOPIC TOTAL HYSTERECTOMY N/A 8/5/2020    Procedure: HYSTERECTOMY, TOTAL, LAPAROSCOPIC;  Surgeon: Claudio Oliva MD;  Location: [facility] OR;  Service: OB/GYN;  Laterality: N/A;  PRE-OP BY RN 7-    ORIF FOOT FRACTURE      right ankle    OTHER SURGICAL HISTORY      retrieved kidney stone without lithotripsy    SALPINGECTOMY Bilateral 8/5/2020    Procedure: SALPINGECTOMY;  Surgeon: Claudio Oliva MD;  Location: [facility] OR;  Service: OB/GYN;  Laterality: " Bilateral;    TONSILLECTOMY, ADENOIDECTOMY Bilateral 11/17/2021    Procedure: TONSILLECTOMY AND ADENOIDECTOMY;  Surgeon: Delano Henning MD;  Location: [facility];  Service: ENT;  Laterality: Bilateral;    TUBAL LIGATION       ALLERGIES updated and reviewed.  Review of patient's allergies indicates:  No Known Allergies    CURRENT OUTPATIENT MEDICATIONS updated and reviewed    Current Outpatient Medications:     omeprazole (PRILOSEC) 40 MG capsule, Take 1 capsule (40 mg total) by mouth before breakfast. Take one pill every morning 45 minutes before breakfast, Disp: 90 capsule, Rfl: 1    tretinoin (RETIN-A) 0.05 % cream, Apply topically every evening. Start with every other night and move up to nightly after 2 weeks if not too dry.  face, Disp: 20 g, Rfl: 1    COVID-19 (COMIRNATY 2024-25, 12Y UP,,PF,) 30 mcg/0.3 mL IM vaccine (>/= 13 yo), Inject 0.3 mLs into the muscle once. for 1 dose, Disp: 0.3 mL, Rfl: 0    hydroCHLOROthiazide (HYDRODIURIL) 25 MG tablet, Take 1 tablet (25 mg total) by mouth once daily., Disp: 90 tablet, Rfl: 3    Review of Systems   Constitutional:  Negative for activity change, appetite change, chills, diaphoresis, fatigue, fever and unexpected weight change.   HENT:  Negative for congestion, ear discharge, ear pain, facial swelling, hearing loss, nosebleeds, postnasal drip, rhinorrhea, sinus pressure, sneezing, sore throat, tinnitus, trouble swallowing and voice change.    Eyes:  Negative for photophobia, pain, discharge, redness, itching and visual disturbance.   Respiratory:  Negative for cough, chest tightness, shortness of breath and wheezing.    Cardiovascular:  Negative for chest pain, palpitations and leg swelling.   Gastrointestinal:  Negative for abdominal distention, abdominal pain, anal bleeding, blood in stool, constipation, diarrhea, nausea, rectal pain and vomiting.   Endocrine: Negative for cold intolerance, heat intolerance, polydipsia, polyphagia and polyuria.  "  Genitourinary:  Negative for difficulty urinating, dysuria and flank pain.   Musculoskeletal:  Negative for arthralgias, back pain, joint swelling, myalgias and neck pain.   Skin:  Negative for rash.   Neurological:  Negative for dizziness, tremors, seizures, syncope, speech difficulty, weakness, light-headedness, numbness and headaches.   Psychiatric/Behavioral:  Negative for behavioral problems, confusion, decreased concentration, dysphoric mood, sleep disturbance and suicidal ideas. The patient is not nervous/anxious and is not hyperactive.        /72 (BP Location: Left arm)   Pulse 79   Resp 18   Ht 5' 6" (1.676 m)   Wt 90 kg (198 lb 6.6 oz)   LMP 07/25/2020 (Exact Date) Comment: 7/2020  SpO2 100%   BMI 32.02 kg/m²   Physical Exam  Vitals and nursing note reviewed.   Constitutional:       General: She is not in acute distress.     Appearance: Normal appearance. She is well-developed. She is not ill-appearing or toxic-appearing.   HENT:      Head: Normocephalic and atraumatic.      Right Ear: Tympanic membrane, ear canal and external ear normal.      Left Ear: Tympanic membrane, ear canal and external ear normal.      Nose: Nose normal.      Mouth/Throat:      Lips: Pink.      Mouth: Mucous membranes are moist.      Pharynx: No oropharyngeal exudate or posterior oropharyngeal erythema.   Eyes:      General: No scleral icterus.        Right eye: No discharge.         Left eye: No discharge.      Extraocular Movements: Extraocular movements intact.      Conjunctiva/sclera: Conjunctivae normal.   Cardiovascular:      Rate and Rhythm: Normal rate and regular rhythm.      Pulses: Normal pulses.      Heart sounds: Normal heart sounds. No murmur heard.  Pulmonary:      Effort: Pulmonary effort is normal. No respiratory distress.      Breath sounds: Normal breath sounds. No wheezing or rales.   Abdominal:      General: Bowel sounds are normal. There is no distension.      Palpations: Abdomen is soft. There " is no mass.      Tenderness: There is no abdominal tenderness. There is no right CVA tenderness, left CVA tenderness, guarding or rebound.      Hernia: No hernia is present.   Musculoskeletal:      Cervical back: Normal range of motion and neck supple. No rigidity or tenderness.   Lymphadenopathy:      Cervical: No cervical adenopathy.   Skin:     General: Skin is warm and dry.   Neurological:      General: No focal deficit present.      Mental Status: She is alert. Mental status is at baseline.   Psychiatric:         Mood and Affect: Mood normal.         Behavior: Behavior normal. Behavior is cooperative.         ASSESSMENT/PLAN    Akilah was seen today for annual exam.    Diagnoses and all orders for this visit:    Routine medical exam  -     CBC Without Differential; Future  -     Comprehensive Metabolic Panel; Future  -     Lipid Panel; Future  -     TSH; Future  -     Hemoglobin A1C; Future    Primary hypertension  -     hydroCHLOROthiazide (HYDRODIURIL) 25 MG tablet; Take 1 tablet (25 mg total) by mouth once daily.    Gastroesophageal reflux disease, unspecified whether esophagitis present    Family history of colonic polyps    Vitamin D insufficiency  Comments:  Continue vitamin-D.            Most recent some lab results reviewed with patient.  Any new prescription medications gone over in detail including reason for taking the medication, most common possible side effects and possible costs, etcetera.    Chronic conditions updated. Other than changes or additions as above, cont current medications and maintain follow-up with specialists if indicated.     - Cautioned the patient against excessive use of internet searches for interpreting results before professional review.     Francisco Yoo MD  A dictation device was used to produce this document. Use of such devices sometimes results in grammatical errors or replacement of words that sound similarly.

## 2025-01-20 ENCOUNTER — PATIENT MESSAGE (OUTPATIENT)
Dept: PRIMARY CARE CLINIC | Facility: CLINIC | Age: 45
End: 2025-01-20
Payer: COMMERCIAL

## 2025-01-21 NOTE — TELEPHONE ENCOUNTER
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

## 2025-01-24 ENCOUNTER — LAB VISIT (OUTPATIENT)
Dept: LAB | Facility: HOSPITAL | Age: 45
End: 2025-01-24
Attending: NURSE PRACTITIONER
Payer: COMMERCIAL

## 2025-01-24 DIAGNOSIS — Z02.0 SCHOOL PHYSICAL EXAM: ICD-10-CM

## 2025-01-24 PROCEDURE — 86480 TB TEST CELL IMMUN MEASURE: CPT | Performed by: NURSE PRACTITIONER

## 2025-01-25 LAB
GAMMA INTERFERON BACKGROUND BLD IA-ACNC: 0.04 IU/ML
M TB IFN-G CD4+ BCKGRND COR BLD-ACNC: 0.01 IU/ML
M TB IFN-G CD4+ BCKGRND COR BLD-ACNC: 0.02 IU/ML
MITOGEN IGNF BCKGRD COR BLD-ACNC: 4.84 IU/ML
TB GOLD PLUS: NEGATIVE

## 2025-02-03 ENCOUNTER — LAB VISIT (OUTPATIENT)
Dept: LAB | Facility: HOSPITAL | Age: 45
End: 2025-02-03
Attending: FAMILY MEDICINE
Payer: COMMERCIAL

## 2025-02-03 DIAGNOSIS — Z02.0 SCHOOL PHYSICAL EXAM: ICD-10-CM

## 2025-02-03 DIAGNOSIS — Z00.00 ROUTINE MEDICAL EXAM: ICD-10-CM

## 2025-02-03 LAB
ALBUMIN SERPL BCP-MCNC: 3.5 G/DL (ref 3.5–5.2)
ALP SERPL-CCNC: 64 U/L (ref 40–150)
ALT SERPL W/O P-5'-P-CCNC: 9 U/L (ref 10–44)
ANION GAP SERPL CALC-SCNC: 13 MMOL/L (ref 8–16)
AST SERPL-CCNC: 17 U/L (ref 10–40)
BILIRUB SERPL-MCNC: 0.7 MG/DL (ref 0.1–1)
BUN SERPL-MCNC: 11 MG/DL (ref 6–20)
CALCIUM SERPL-MCNC: 9.4 MG/DL (ref 8.7–10.5)
CHLORIDE SERPL-SCNC: 105 MMOL/L (ref 95–110)
CHOLEST SERPL-MCNC: 186 MG/DL (ref 120–199)
CHOLEST/HDLC SERPL: 3.7 {RATIO} (ref 2–5)
CO2 SERPL-SCNC: 20 MMOL/L (ref 23–29)
CREAT SERPL-MCNC: 0.8 MG/DL (ref 0.5–1.4)
ERYTHROCYTE [DISTWIDTH] IN BLOOD BY AUTOMATED COUNT: 13.9 % (ref 11.5–14.5)
EST. GFR  (NO RACE VARIABLE): >60 ML/MIN/1.73 M^2
ESTIMATED AVG GLUCOSE: 94 MG/DL (ref 68–131)
GLUCOSE SERPL-MCNC: 70 MG/DL (ref 70–110)
HBA1C MFR BLD: 4.9 % (ref 4–5.6)
HCT VFR BLD AUTO: 37.6 % (ref 37–48.5)
HDLC SERPL-MCNC: 50 MG/DL (ref 40–75)
HDLC SERPL: 26.9 % (ref 20–50)
HGB BLD-MCNC: 12.4 G/DL (ref 12–16)
LDLC SERPL CALC-MCNC: 123.2 MG/DL (ref 63–159)
MCH RBC QN AUTO: 30.8 PG (ref 27–31)
MCHC RBC AUTO-ENTMCNC: 33 G/DL (ref 32–36)
MCV RBC AUTO: 93 FL (ref 82–98)
NONHDLC SERPL-MCNC: 136 MG/DL
PLATELET # BLD AUTO: 220 K/UL (ref 150–450)
PMV BLD AUTO: 12.3 FL (ref 9.2–12.9)
POTASSIUM SERPL-SCNC: 4 MMOL/L (ref 3.5–5.1)
PROT SERPL-MCNC: 7.1 G/DL (ref 6–8.4)
RBC # BLD AUTO: 4.03 M/UL (ref 4–5.4)
SODIUM SERPL-SCNC: 138 MMOL/L (ref 136–145)
TRIGL SERPL-MCNC: 64 MG/DL (ref 30–150)
TSH SERPL DL<=0.005 MIU/L-ACNC: 1.31 UIU/ML (ref 0.4–4)
WBC # BLD AUTO: 4.24 K/UL (ref 3.9–12.7)

## 2025-02-03 PROCEDURE — 83036 HEMOGLOBIN GLYCOSYLATED A1C: CPT | Performed by: FAMILY MEDICINE

## 2025-02-03 PROCEDURE — 86765 RUBEOLA ANTIBODY: CPT | Performed by: NURSE PRACTITIONER

## 2025-02-03 PROCEDURE — 36415 COLL VENOUS BLD VENIPUNCTURE: CPT | Mod: PN | Performed by: NURSE PRACTITIONER

## 2025-02-03 PROCEDURE — 80061 LIPID PANEL: CPT | Performed by: FAMILY MEDICINE

## 2025-02-03 PROCEDURE — 85027 COMPLETE CBC AUTOMATED: CPT | Performed by: FAMILY MEDICINE

## 2025-02-03 PROCEDURE — 86762 RUBELLA ANTIBODY: CPT | Performed by: NURSE PRACTITIONER

## 2025-02-03 PROCEDURE — 84443 ASSAY THYROID STIM HORMONE: CPT | Performed by: FAMILY MEDICINE

## 2025-02-03 PROCEDURE — 80053 COMPREHEN METABOLIC PANEL: CPT | Performed by: FAMILY MEDICINE

## 2025-02-04 LAB
RUBEOLA IGG ANTIBODY: 6.09 AU/ML
RUBEOLA INTERPRETATION: NEGATIVE
RUBV IGG SER-ACNC: 31.7 IU/ML
RUBV IGG SER-IMP: REACTIVE

## 2025-02-18 ENCOUNTER — RESULTS FOLLOW-UP (OUTPATIENT)
Dept: PRIMARY CARE CLINIC | Facility: CLINIC | Age: 45
End: 2025-02-18

## 2025-05-19 ENCOUNTER — TELEPHONE (OUTPATIENT)
Dept: ORTHOPEDICS | Facility: CLINIC | Age: 45
End: 2025-05-19
Payer: COMMERCIAL

## 2025-05-19 DIAGNOSIS — M79.641 RIGHT HAND PAIN: Primary | ICD-10-CM

## 2025-05-19 NOTE — TELEPHONE ENCOUNTER
Spoke to patient explained that she was scheduled incorrectly and moved her appointment for 8 am with Ivy Barron MD. The patient understood and agreed.

## 2025-05-20 ENCOUNTER — HOSPITAL ENCOUNTER (OUTPATIENT)
Dept: RADIOLOGY | Facility: OTHER | Age: 45
Discharge: HOME | End: 2025-05-20
Attending: ORTHOPAEDIC SURGERY
Payer: COMMERCIAL

## 2025-05-20 ENCOUNTER — OFFICE VISIT (OUTPATIENT)
Dept: ORTHOPEDICS | Facility: CLINIC | Age: 45
End: 2025-05-20
Payer: COMMERCIAL

## 2025-05-20 VITALS — WEIGHT: 198.44 LBS | HEIGHT: 66 IN | BODY MASS INDEX: 31.89 KG/M2

## 2025-05-20 DIAGNOSIS — M79.641 RIGHT HAND PAIN: ICD-10-CM

## 2025-05-20 DIAGNOSIS — M67.431 GANGLION CYST OF DORSUM OF RIGHT WRIST: Primary | ICD-10-CM

## 2025-05-20 PROCEDURE — 3044F HG A1C LEVEL LT 7.0%: CPT | Mod: CPTII,S$GLB,, | Performed by: ORTHOPAEDIC SURGERY

## 2025-05-20 PROCEDURE — 20612 ASPIRATE/INJ GANGLION CYST: CPT | Mod: S$GLB,,, | Performed by: ORTHOPAEDIC SURGERY

## 2025-05-20 PROCEDURE — 73130 X-RAY EXAM OF HAND: CPT | Mod: TC,FY,RT

## 2025-05-20 PROCEDURE — 1159F MED LIST DOCD IN RCRD: CPT | Mod: CPTII,S$GLB,, | Performed by: ORTHOPAEDIC SURGERY

## 2025-05-20 PROCEDURE — 3008F BODY MASS INDEX DOCD: CPT | Mod: CPTII,S$GLB,, | Performed by: ORTHOPAEDIC SURGERY

## 2025-05-20 PROCEDURE — 99999 PR PBB SHADOW E&M-EST. PATIENT-LVL III: CPT | Mod: PBBFAC,,, | Performed by: ORTHOPAEDIC SURGERY

## 2025-05-20 PROCEDURE — 99204 OFFICE O/P NEW MOD 45 MIN: CPT | Mod: 25,S$GLB,, | Performed by: ORTHOPAEDIC SURGERY

## 2025-05-20 PROCEDURE — 73130 X-RAY EXAM OF HAND: CPT | Mod: 26,RT,, | Performed by: RADIOLOGY

## 2025-05-20 RX ADMIN — DEXAMETHASONE SODIUM PHOSPHATE 4 MG: 4 INJECTION, SOLUTION INTRA-ARTICULAR; INTRALESIONAL; INTRAMUSCULAR; INTRAVENOUS; SOFT TISSUE at 08:05

## 2025-05-20 NOTE — PROCEDURES
Ganglion Cyst: R radiocarpal    Date/Time: 5/20/2025 8:00 AM    Performed by: Ivy Barron MD  Authorized by: Ivy Barron MD    Consent Done?:  Yes (Verbal)  Indications:  Pain  Timeout: prior to procedure the correct patient, procedure, and site was verified    Local anesthesia used?: Yes    Anesthesia:  Local infiltration  Local anesthetic:  Lidocaine 1% without epinephrine  Location:  Wrist  Site:  R radiocarpal  Approach:  Dorsal  Medications:  4 mg dexAMETHasone 4 mg/mL

## 2025-05-20 NOTE — PROGRESS NOTES
Patient ID: Akilah Parker is a 44 y.o. female.    Chief Complaint: Pain and Cyst of the Right Hand    History of Present Illness    CHIEF COMPLAINT:  Right hand pain following ganglion cyst removal surgery    HPI:  Ms. Parker presents with right hand pain following ganglion cyst removal surgery performed by Dr. Rodriguez. Her pain has not subsided and now radiates to her fingertips. She describes pain as intense, rating it 5-6/10 today, but notes it was 10/10 yesterday. Pain worsens with hand flexion and extension, with tenderness in a specific area. Symptoms began about a week ago and have become inflamed, significantly impacting her ability to work.    Prior to surgery, she had been getting the cyst drained every six to twelve months at Dr. Rodriguez's office. This is her first appointment in a few years due to a change in insurance. She has been using a brace, which usually causes discomfort with hand flexion and extension, but is not currently uncomfortable. She has been taking Aleve for pain management.    Ms. 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    She denies having rheumatoid arthritis, lupus, or any other rheumatological conditions.    PREVIOUS TREATMENTS:  Ms. Parker underwent cyst drainage every six to twelve months for several years before opting for surgical removal.    MEDICATIONS:  Ms. Parker is on Aleve or Advil for pain management.    SURGICAL HISTORY:  Ms. Parker has undergone ganglion cyst removal on her right hand.    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 Her work requires repetitive hand movements. She is experiencing hand pain that is affecting her ability to work. Last week, she was unable to perform her job duties effectively due to the pain.    IMAGING:  An X-ray of the right hand revealed well-spaced joints.      ROS:  General: denies fever, denies chills,  denies fatigue, denies weight gain, denies weight loss  Eyes: denies vision changes, denies redness, denies discharge  ENT: denies ear pain, denies nasal congestion, denies sore throat  Cardiovascular: denies chest pain, denies palpitations, denies lower extremity edema  Respiratory: denies cough, denies shortness of breath  Gastrointestinal: denies abdominal pain, denies nausea, denies vomiting, denies diarrhea, denies constipation, denies blood in stool  Genitourinary: denies dysuria, denies hematuria, denies frequency  Musculoskeletal: reports joint pain, denies muscle pain, reports limb pain, reports shooting pain sensation, reports nerve pain, reports pain with movement  Skin: denies rash, denies lesion  Neurological: denies headache, denies dizziness, denies numbness, denies tingling  Psychiatric: denies anxiety, denies depression, denies sleep difficulty         Physical Exam    MSK: Hand/Wrist - Right: Pain with wrist flexion. Pain with wrist extension. Pain with tapping that radiates to knuckles. Full first (thumb) carpometacarpal (CMC) joint ROM (Right). Full finger ROM (Right). Cystic area noted. Normal sensation.         Assessment & Plan     Recommend steroid injection for the recurrent cyst and alleviate pain.   Ms. Parker agreed to proceed with Dexamethasone injection.   Injection will include draining any fluid from the cyst.              No follow-ups on file.    This note was generated with the assistance of ambient listening technology. Verbal consent was obtained by the patient and accompanying visitor(s) for the recording of patient appointment to facilitate this note. I attest to having reviewed and edited the generated note for accuracy, though some syntax or spelling errors may persist. Please contact the author of this note for any clarification.Patient ID: Akilah Parker is a 44 y.o. female.    Chief Complaint: Pain and Cyst of the Right Hand    History of Present Illness    CHIEF  COMPLAINT:  Right hand pain following ganglion cyst removal surgery    HPI:  Ms. Parker presents with right hand pain following ganglion cyst removal surgery performed by Dr. Rodriguez. Her pain has not subsided and now radiates to her fingertips. She describes pain as intense, rating it 5-6/10 today, but notes it was 10/10 yesterday. Pain worsens with hand flexion and extension, with tenderness in a specific area. Symptoms began about a week ago and have become inflamed, significantly impacting her ability to work.    Prior to surgery, she had been getting the cyst drained every six to twelve months at Dr. Rodriguez's office. This is her first appointment in a few years due to a change in insurance. She has been using a brace, which usually causes discomfort with hand flexion and extension, but is not currently uncomfortable. She has been taking Aleve for pain management.    Ms. 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    She denies having rheumatoid arthritis, lupus, or any other rheumatological conditions.    PREVIOUS TREATMENTS:  Ms. Parker underwent cyst drainage every six to twelve months for several years before opting for surgical removal.    MEDICATIONS:  Ms. Parker is on Aleve or Advil for pain management.    SURGICAL HISTORY:  Ms. Parker has undergone ganglion cyst removal on her right hand.    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 Her work requires repetitive hand movements. She is experiencing hand pain that is affecting her ability to work. Last week, she was unable to perform her job duties effectively due to the pain.    IMAGING:  An X-ray of the right hand revealed well-spaced joints.      ROS:  General: denies fever, denies chills, denies fatigue, denies weight gain, denies weight loss  Eyes: denies vision changes, denies redness, denies discharge  ENT: denies ear pain,  denies nasal congestion, denies sore throat  Cardiovascular: denies chest pain, denies palpitations, denies lower extremity edema  Respiratory: denies cough, denies shortness of breath  Gastrointestinal: denies abdominal pain, denies nausea, denies vomiting, denies diarrhea, denies constipation, denies blood in stool  Genitourinary: denies dysuria, denies hematuria, denies frequency  Musculoskeletal: reports joint pain, denies muscle pain, reports limb pain, reports shooting pain sensation, reports nerve pain, reports pain with movement  Skin: denies rash, denies lesion  Neurological: denies headache, denies dizziness, denies numbness, denies tingling  Psychiatric: denies anxiety, denies depression, denies sleep difficulty         Physical Exam    MSK: Hand/Wrist - Right: Pain with wrist flexion. Pain with wrist extension. Pain with tapping that radiates to knuckles. Full first (thumb) carpometacarpal (CMC) joint ROM (Right). Full finger ROM (Right). Cystic area noted. Normal sensation.         Assessment & Plan     Recommend steroid injection for the recurrent cyst and alleviate pain.   Ms. Parker agreed to proceed with Dexamethasone injection.   Injection will include draining any fluid from the cyst.              No follow-ups on file.    This note was generated with the assistance of ambient listening technology. Verbal consent was obtained by the patient and accompanying visitor(s) for the recording of patient appointment to facilitate this note. I attest to having reviewed and edited the generated note for accuracy, though some syntax or spelling errors may persist. Please contact the author of this note for any clarification.

## 2025-06-11 RX ORDER — DEXAMETHASONE SODIUM PHOSPHATE 4 MG/ML
4 INJECTION, SOLUTION INTRA-ARTICULAR; INTRALESIONAL; INTRAMUSCULAR; INTRAVENOUS; SOFT TISSUE
Status: DISCONTINUED | OUTPATIENT
Start: 2025-05-20 | End: 2025-06-11 | Stop reason: HOSPADM

## 2025-07-09 ENCOUNTER — OFFICE VISIT (OUTPATIENT)
Dept: ORTHOPEDICS | Facility: CLINIC | Age: 45
End: 2025-07-09
Payer: COMMERCIAL

## 2025-07-09 VITALS
HEIGHT: 66 IN | BODY MASS INDEX: 31.13 KG/M2 | SYSTOLIC BLOOD PRESSURE: 113 MMHG | WEIGHT: 193.69 LBS | DIASTOLIC BLOOD PRESSURE: 76 MMHG | HEART RATE: 64 BPM

## 2025-07-09 DIAGNOSIS — M67.431 GANGLION CYST OF DORSUM OF RIGHT WRIST: Primary | ICD-10-CM

## 2025-07-09 PROCEDURE — 99999 PR PBB SHADOW E&M-EST. PATIENT-LVL III: CPT | Mod: PBBFAC,,, | Performed by: ORTHOPAEDIC SURGERY

## 2025-07-09 PROCEDURE — 3008F BODY MASS INDEX DOCD: CPT | Mod: CPTII,S$GLB,, | Performed by: ORTHOPAEDIC SURGERY

## 2025-07-09 PROCEDURE — 3078F DIAST BP <80 MM HG: CPT | Mod: CPTII,S$GLB,, | Performed by: ORTHOPAEDIC SURGERY

## 2025-07-09 PROCEDURE — 20612 ASPIRATE/INJ GANGLION CYST: CPT | Mod: S$GLB,,, | Performed by: ORTHOPAEDIC SURGERY

## 2025-07-09 PROCEDURE — 3074F SYST BP LT 130 MM HG: CPT | Mod: CPTII,S$GLB,, | Performed by: ORTHOPAEDIC SURGERY

## 2025-07-09 PROCEDURE — 3044F HG A1C LEVEL LT 7.0%: CPT | Mod: CPTII,S$GLB,, | Performed by: ORTHOPAEDIC SURGERY

## 2025-07-09 PROCEDURE — 1159F MED LIST DOCD IN RCRD: CPT | Mod: CPTII,S$GLB,, | Performed by: ORTHOPAEDIC SURGERY

## 2025-07-09 PROCEDURE — 99214 OFFICE O/P EST MOD 30 MIN: CPT | Mod: 25,S$GLB,, | Performed by: ORTHOPAEDIC SURGERY

## 2025-07-09 NOTE — PROCEDURES
Ganglion Cyst: R radiocarpal    Date/Time: 7/9/2025 10:30 AM    Performed by: Bao Alvarado MD  Authorized by: Bao Alvarado MD    Consent Done?:  Yes (Verbal)  Indications:  Pain  Prep: patient was prepped and draped in usual sterile fashion      Anesthesia:  Local infiltration  Local anesthetic:  Lidocaine 1% without epinephrine  Anesthetic total (ml):  10    Location:  Wrist  Site:  R radiocarpal  Needle size:  18 G  Approach:  Dorsal  Aspirate amount (ml):  0  Patient tolerance:  Patient tolerated the procedure well with no immediate complications

## 2025-07-09 NOTE — PROGRESS NOTES
SUBJECTIVE:      Chief Complaint:  Right dorsal hand cyst    History of Present Illness:  Patient is a 44 y.o. right hand dominant female who presents today with complaints of right dorsal hand cyst.  Reports long history of the cyst which appeared approximately 20 years ago after jumping off a table.  She previously had surgery for this but ended up with some numbness around the area and is not interested in further surgery.  She was a previous patient of Dr. Rodriguez and had this aspirated/injected multiple times.  States aspiration/injection would last a year to but it would always come back.  Reports it does fluctuate in size.  Denies any redness or drainage.  Recently she went to a hand surgeon who attempted to aspirate but was unsuccessful.  She was given Kenalog injection in the area 7 weeks ago.  She is interested in trial of aspiration today..     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    Onset of symptoms/DOI was years ago.    Symptoms are aggravated by movement.    Symptoms are alleviated by aspiration/injection.    Symptoms consist of mass.    The patient rates their pain as a 2/10.    Attempted treatment(s) and/or interventions include activity modifications, rest, aspiration/injection and surgery.     The patient denies any fevers, chills, N/V, D/C and presents for evaluation.       Past Medical History:   Diagnosis Date    Acne vulgaris     Fibroids, intramural 05/27/2020    Ganglion cyst     bilateral     GERD (gastroesophageal reflux disease)     History of kidney stones 2012    Hypertension     IN PAST    CHARISSE (iron deficiency anemia)     Menorrhagia with regular cycle 05/12/2020    Nephrolithiasis     PONV (postoperative nausea and vomiting)     Thyroid nodule 2014    Scattered micro nodules, stable in size compared to 2014     Past Surgical History:   Procedure Laterality Date    LAPAROSCOPIC TOTAL HYSTERECTOMY N/A 8/5/2020    Procedure: HYSTERECTOMY, TOTAL,  LAPAROSCOPIC;  Surgeon: Claudio Oliva MD;  Location: [facility] OR;  Service: OB/GYN;  Laterality: N/A;  PRE-OP BY RN 7-    ORIF FOOT FRACTURE      right ankle    OTHER SURGICAL HISTORY      retrieved kidney stone without lithotripsy    SALPINGECTOMY Bilateral 8/5/2020    Procedure: SALPINGECTOMY;  Surgeon: Claudio Oliva MD;  Location: [facility] OR;  Service: OB/GYN;  Laterality: Bilateral;    TONSILLECTOMY, ADENOIDECTOMY Bilateral 11/17/2021    Procedure: TONSILLECTOMY AND ADENOIDECTOMY;  Surgeon: Delano Henning MD;  Location: [facility] OR;  Service: ENT;  Laterality: Bilateral;    TUBAL LIGATION       Review of patient's allergies indicates:  No Known Allergies  Social History     Social History Narrative    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     Family History   Problem Relation Name Age of Onset    Hypertension Mother      Colon polyps Mother          40s, precancerous    Hyperlipidemia Father      Hypertension Father      Stroke Father  57    Aneurysm Maternal Grandmother      Heart disease Maternal Grandfather      Heart disease Paternal Grandmother      Acne Neg Hx      Eczema Neg Hx      Melanoma Neg Hx      Breast cancer Neg Hx      Colon cancer Neg Hx      Ovarian cancer Neg Hx      Diabetes Neg Hx      Celiac disease Neg Hx      Cirrhosis Neg Hx      Crohn's disease Neg Hx      Esophageal cancer Neg Hx      Inflammatory bowel disease Neg Hx      Liver cancer Neg Hx      Liver disease Neg Hx      Rectal cancer Neg Hx      Stomach cancer Neg Hx      Ulcerative colitis Neg Hx      Pancreatic cancer Neg Hx      Uterine cancer Neg Hx      Bladder Cancer Neg Hx      Kidney cancer Neg Hx         Current Medications[1]      Review of Systems:  Constitutional: no fever or chills  Eyes: no visual changes  ENT: no nasal congestion or sore throat  Respiratory: no cough or shortness of breath  Cardiovascular: no chest pain  Gastrointestinal: no nausea or vomiting, tolerating  "diet  Musculoskeletal: ganglion cyst    OBJECTIVE:      Vital Signs (Most Recent):  Vitals:    07/09/25 1059   BP: 113/76   Pulse: 64   Weight: 87.9 kg (193 lb 10.8 oz)   Height: 5' 6" (1.676 m)     Body mass index is 31.26 kg/m².      Physical Exam:  Constitutional: The patient appears well-developed and well-nourished. No distress.   Skin: No lesions appreciated  Head: Normocephalic and atraumatic.   Nose: Nose normal.   Ears: No deformities seen  Eyes: Conjunctivae and EOM are normal.   Neck: No tracheal deviation present.   Cardiovascular: Normal rate and intact distal pulses.    Pulmonary/Chest: Effort normal. No respiratory distress.   Abdominal: There is no guarding.   Neurological: The patient is alert.   Psychiatric: The patient has a normal mood and affect.     Right Hand/Wrist Examination:    Observation/Inspection:  Swelling  none    Deformity  none  Discoloration  none     Scars   none    Atrophy  None  Small marble sized palpable mass at radiocarpal joint on dorsal side, no overlying skin changes    HAND/WRIST EXAMINATION:  Finkelstein's Test   Neg  WHAT Test    Neg  TTP     cyst    Neurovascular Exam:  Digits WWP, brisk CR < 3s throughout  NVI motor/LTS to M/R/U nerves, radial pulse 2+  Tinel's Test - Carpal Tunnel  Neg  Tinel's Test - Cubital Tunnel  Neg  Phalen's Test    Neg  Median Nerve Compression Test Neg  AIN Intact (O-Noreen), PIN Intact (Thumbs Up), Ulnar Intact (scissors)    ROM hand full, painless    ROM wrist full, painless    ROM elbow full, painless    Abdomen not guarded  Respirations nonlabored  Perfusion intact    Diagnostic Results:     Imaging - I independently viewed the patient's imaging as well as the radiology report.  Xrays of the patient's right hand  demonstrate no evidence of any obvious acute fractures or dislocations or significant degenerative changes.    ASSESSMENT/PLAN:      1. Ganglion cyst of dorsum of right wrist              I made the decision to obtain old records of " the patient including previous notes and imaging. If new imaging was ordered today of the extremity or extremities evaluated. I independently reviewed and interpreted the x-rays as well as prior imaging. Reviewed imaging in detail with patient.     We discussed at length different treatment options including conservative vs surgical management. These include anti-inflammatories, acetaminophen, rest, ice, heat, formal physical therapy including strengthening and stretching exercises, home exercise programs, injections, and finally surgical intervention.      We attempted a repeat aspiration of her right wrist ganglion cyst today under ultrasound guidance.  This appeared loculated under ultrasound and without much fluid.  We numbed up the dorsum of the wrist with 10 cc of lidocaine and injected a 18 gauge needle within this cystic loculated mass however we did not yield any significant fluid.  My personal opinion is that this likely needs to be surgically excised.  She is not interested in surgery at this time however.  Continue bracing and can follow up as needed           [1]   Current Outpatient Medications:     hydroCHLOROthiazide (HYDRODIURIL) 25 MG tablet, Take 1 tablet (25 mg total) by mouth once daily., Disp: 90 tablet, Rfl: 3    tretinoin (RETIN-A) 0.1 % cream, Apply topically every evening., Disp: 45 g, Rfl: 3    omeprazole (PRILOSEC) 40 MG capsule, Take 1 capsule (40 mg total) by mouth before breakfast. Take one pill every morning 45 minutes before breakfast, Disp: 90 capsule, Rfl: 1

## 2025-08-12 ENCOUNTER — OFFICE VISIT (OUTPATIENT)
Dept: OPTOMETRY | Facility: CLINIC | Age: 45
End: 2025-08-12
Payer: COMMERCIAL

## 2025-08-12 DIAGNOSIS — H52.4 PRESBYOPIA OF BOTH EYES: Primary | ICD-10-CM

## 2025-08-12 PROCEDURE — 92015 DETERMINE REFRACTIVE STATE: CPT | Mod: S$GLB,,, | Performed by: OPTOMETRIST

## 2025-08-12 PROCEDURE — 1159F MED LIST DOCD IN RCRD: CPT | Mod: CPTII,S$GLB,, | Performed by: OPTOMETRIST

## 2025-08-12 PROCEDURE — 3044F HG A1C LEVEL LT 7.0%: CPT | Mod: CPTII,S$GLB,, | Performed by: OPTOMETRIST

## 2025-08-12 PROCEDURE — 99999 PR PBB SHADOW E&M-EST. PATIENT-LVL II: CPT | Mod: PBBFAC,,, | Performed by: OPTOMETRIST

## 2025-08-12 PROCEDURE — 92014 COMPRE OPH EXAM EST PT 1/>: CPT | Mod: S$GLB,,, | Performed by: OPTOMETRIST

## 2025-08-12 RX ORDER — ACYCLOVIR 400 MG/1
400 TABLET ORAL 2 TIMES DAILY
COMMUNITY
Start: 2025-06-04

## 2025-08-14 DIAGNOSIS — Z12.31 OTHER SCREENING MAMMOGRAM: ICD-10-CM

## (undated) DEVICE — POSITIONER IV ARMBOARD FOAM

## (undated) DEVICE — DRESSING ADH ISLAND 2.5 X 3

## (undated) DEVICE — UNDERGLOVES BIOGEL PI SIZE 7.5

## (undated) DEVICE — TROCAR ENDOPATH XCEL 5X100MM

## (undated) DEVICE — SOL 9P NACL IRR PIC IL

## (undated) DEVICE — OCCLUDER COLPO-PNEUMO STERILE

## (undated) DEVICE — SEE MEDLINE ITEM 146292

## (undated) DEVICE — PAD PREP 50/CA

## (undated) DEVICE — SOL NS 1000CC

## (undated) DEVICE — TIP RUMI GREEN DISPOSABLE6.7MM

## (undated) DEVICE — GLOVE SURG BIOGEL LATEX SZ 7.5

## (undated) DEVICE — TIP YANKAUERS BULB NO VENT

## (undated) DEVICE — PACK LAPAROSCOPY/PELVISCOPY II

## (undated) DEVICE — TOWEL OR DISP STRL BLUE 4/PK

## (undated) DEVICE — GLOVE BIOGEL PI MICRO SZ 6.5

## (undated) DEVICE — SYR 10CC LUER LOCK

## (undated) DEVICE — SEE MEDLINE ITEM 154981

## (undated) DEVICE — TUBE SUMP NASOGASTRIC 16FR

## (undated) DEVICE — CANISTER SUCTION 2 LTR

## (undated) DEVICE — SEE MEDLINE ITEM 157117

## (undated) DEVICE — GLOVE BIOGEL SKINSENSE PI 6.5

## (undated) DEVICE — HOOK DISSECTING 5MM

## (undated) DEVICE — SPONGE TONSIL DBL STRG MED STR

## (undated) DEVICE — SOL CLEARIFY VISUALIZATION LAP

## (undated) DEVICE — COVER OVERHEAD SURG LT BLUE

## (undated) DEVICE — SYR 50CC LL

## (undated) DEVICE — PAD SANITARY OB STERILE

## (undated) DEVICE — SEE MEDLINE ITEM 157110

## (undated) DEVICE — UNDERGLOVE BIOGEL PI SZ 6.5 LF

## (undated) DEVICE — GLOVE BIOGEL SKINSENSE PI 7.5

## (undated) DEVICE — SEE MEDLINE ITEM 152622

## (undated) DEVICE — SUT VICRYL PLUS 0 CT1 36IN

## (undated) DEVICE — SKIN MARKER DEVON 160

## (undated) DEVICE — TROCAR ENDOPATH XCEL 11MM 10CM

## (undated) DEVICE — BLADE SURG CARBON STEEL SZ11

## (undated) DEVICE — ENDOSTITCH INSTRUMENT

## (undated) DEVICE — Device

## (undated) DEVICE — MAT QUICK 40X30 FLOOR FLUID LF

## (undated) DEVICE — SNARE TONSIL

## (undated) DEVICE — POSITIONER HEAD DONUT 9IN FOAM

## (undated) DEVICE — SUPPORT ULNA NERVE PROTECTOR

## (undated) DEVICE — SEE MEDLINE ITEM 157150

## (undated) DEVICE — GLOVE BIOGEL PI MICRO SZ 7

## (undated) DEVICE — ELECTRODE BLD EXT INSUL 1

## (undated) DEVICE — SUT VLOC 180 ABSORB ESTITCH

## (undated) DEVICE — DRAPE STERI LONG

## (undated) DEVICE — ELECTRODE REM PLYHSV RETURN 9

## (undated) DEVICE — TRAY FOLEY 16FR INFECTION CONT

## (undated) DEVICE — GOWN XX-LARGE

## (undated) DEVICE — ADAPTER DISP HAND SWITCH

## (undated) DEVICE — UNDERGLOVES BIOGEL PI SZ 7 LF

## (undated) DEVICE — IRRIGATOR ENDOSCOPY DISP.

## (undated) DEVICE — SOL PVP-I SCRUB 7.5% 4OZ

## (undated) DEVICE — SUT RAPIDE 4-0

## (undated) DEVICE — KIT ANTIFOG

## (undated) DEVICE — GLOVE BIOGEL SKINSENSE PI 7.0

## (undated) DEVICE — BLANKET UPPER BODY 78.7X29.9IN

## (undated) DEVICE — TUBING INSUFFLATION 10

## (undated) DEVICE — CATH RED RUBBER 8FR

## (undated) DEVICE — DEVICE N-SEAL LAPROSCOPIC

## (undated) DEVICE — APPLICATOR CHLORAPREP ORN 26ML

## (undated) DEVICE — NDL INSUF ULTRA VERESS 120MM

## (undated) DEVICE — SEE MEDLINE ITEM 152487